# Patient Record
Sex: FEMALE | Race: WHITE | Employment: OTHER | URBAN - METROPOLITAN AREA
[De-identification: names, ages, dates, MRNs, and addresses within clinical notes are randomized per-mention and may not be internally consistent; named-entity substitution may affect disease eponyms.]

---

## 2018-07-06 ENCOUNTER — OFFICE VISIT (OUTPATIENT)
Dept: ORTHOPEDIC SURGERY | Age: 73
End: 2018-07-06

## 2018-07-06 VITALS
DIASTOLIC BLOOD PRESSURE: 68 MMHG | HEIGHT: 61 IN | HEART RATE: 67 BPM | WEIGHT: 140 LBS | SYSTOLIC BLOOD PRESSURE: 120 MMHG | BODY MASS INDEX: 26.43 KG/M2

## 2018-07-06 DIAGNOSIS — M70.61 GREATER TROCHANTERIC BURSITIS OF RIGHT HIP: Primary | ICD-10-CM

## 2018-07-06 DIAGNOSIS — M25.551 RIGHT HIP PAIN: ICD-10-CM

## 2018-07-06 PROCEDURE — 99213 OFFICE O/P EST LOW 20 MIN: CPT | Performed by: PHYSICIAN ASSISTANT

## 2018-07-06 NOTE — PROGRESS NOTES
Chief Complaint    Hip Pain (rt hip lateral pain for about 2-3 months)      History of Present Illness:  Efraín Keith is a 67 y.o. female presents to the office today for a new problem. Patient here complaining of right lateral hip pain for past 2-3 months with no injury or trauma. She denies groin or buttocks pain. She denies lumbar pain or radicular symptoms. She does have a past medical history significant for lumbar scoliosis. She does have an suffer from myelodysplastic syndrome and was told not to take nonsteroidal anti-inflammatory medication.     Pain Assessment  Location of Pain: Pelvis  Location Modifiers: Right  Severity of Pain: 8  Frequency of Pain: Intermittent  Aggravating Factors:  (laying on it)  Limiting Behavior: Some  Result of Injury: No  Work-Related Injury: No  Are there other pain locations you wish to document?: No]       Medical History:  Past Medical History:   Diagnosis Date    Arthritis     Bone marrow disorder     \"sluggish bone marrow\"    Cancer [facility]) breast 1990    COPD (chronic obstructive pulmonary disease) ([address]     Diabetes mellitus ([address]     Greater trochanteric bursitis of right hip 7/6/2018    Hyperlipidemia     Macular degeneration (senile) of retina      Patient Active Problem List    Diagnosis Date Noted    MDS (myelodysplastic syndrome), low grade ([address] 02/10/2015     Priority: High    Greater trochanteric bursitis of right hip 07/06/2018    Personal history of breast cancer 05/16/2016    Wrist sprain 09/11/2014    Closed fracture of ankle 04/17/2014     Past Surgical History:   Procedure Laterality Date    BREAST RECONSTRUCTION      transflap reconstruction - bilateral    BREAST SURGERY  herman mastectomy   reconstruction         1990    CAPSULOTOMY Right 10/2016    COLONOSCOPY  3/14/11    FOOT FUSION      bunionectomy    FOOT SURGERY  04/25/2013    right foot surgery, toes straightened, \"lump\" removed from top of foot    HAND SURGERY 40 mg by mouth every morning.  gabapentin (NEURONTIN) 300 MG capsule TAKE ONE CAPSULE BY MOUTH TWICE A DAY AND AT BEDTIME 90 capsule 0    dicyclomine (BENTYL) 10 MG capsule Take 10 mg by mouth 4 times daily (before meals and nightly)      Multiple Vitamins-Minerals (THERAPEUTIC MULTIVITAMIN-MINERALS) tablet Take 1 tablet by mouth daily. No current facility-administered medications for this visit. Review of Systems:  Relevant review of systems reviewed and available in the patient's chart    Vital Signs:  Vitals:    07/06/18 1109   BP: 120/68   Pulse: 67       General Exam:   Constitutional: Patient is adequately groomed with no evidence of malnutrition  DTRs: Deep tendon reflexes are intact  Mental Status: The patient is oriented to time, place and person. The patient's mood and affect are appropriate. Lymphatic: The lymphatic examination bilaterally reveals all areas to be without enlargement or induration. Vascular: Examination reveals no swelling or calf tenderness. Peripheral pulses are palpable and 2+. Neurological: The patient has good coordination. There is no weakness or sensory deficit. Right hip Examination:    Inspection:  No erythema or signs of infection. There are no cutaneous lesions. Palpation:  There is exquisite tenderness over the greater trochanteric region. Range of Motion:  Full pain-free range of motion right hip    Strength:  5 over 5 strength with hip flexion and extension    Special Tests:  Positive Johnathan's test.  Negative log roll maneuver. Negative Homans test.    Skin: There are no rashes, ulcerations or lesions. Gait: Slightly antalgic gait favoring the unaffected side. Reflex 2+ patellar    Additional Comments:       Additional Examinations:         Contralateral Exam: Examination of the left hip reveals intact skin. The patient demonstrates full painless range of motion with regards to flexion, abduction, internal and external rotation.  There

## 2019-02-18 ENCOUNTER — OFFICE VISIT (OUTPATIENT)
Dept: ORTHOPEDIC SURGERY | Age: 74
End: 2019-02-18
Payer: MEDICARE

## 2019-02-18 VITALS — HEIGHT: 61 IN | BODY MASS INDEX: 26.43 KG/M2 | WEIGHT: 139.99 LBS

## 2019-02-18 DIAGNOSIS — M51.36 DDD (DEGENERATIVE DISC DISEASE), LUMBAR: ICD-10-CM

## 2019-02-18 DIAGNOSIS — M47.816 LUMBAR SPONDYLOSIS: ICD-10-CM

## 2019-02-18 DIAGNOSIS — M41.25 OTHER IDIOPATHIC SCOLIOSIS, THORACOLUMBAR REGION: ICD-10-CM

## 2019-02-18 DIAGNOSIS — M54.50 LUMBAR PAIN: Primary | ICD-10-CM

## 2019-02-18 PROCEDURE — 99203 OFFICE O/P NEW LOW 30 MIN: CPT | Performed by: PHYSICAL MEDICINE & REHABILITATION

## 2019-02-18 RX ORDER — MELOXICAM 15 MG/1
15 TABLET ORAL DAILY
Qty: 30 TABLET | Refills: 1 | Status: SHIPPED | OUTPATIENT
Start: 2019-02-18

## 2019-05-15 ENCOUNTER — OFFICE VISIT (OUTPATIENT)
Dept: ORTHOPEDIC SURGERY | Age: 74
End: 2019-05-15
Payer: MEDICARE

## 2019-05-15 VITALS
DIASTOLIC BLOOD PRESSURE: 68 MMHG | HEIGHT: 61 IN | HEART RATE: 65 BPM | SYSTOLIC BLOOD PRESSURE: 107 MMHG | WEIGHT: 130 LBS | BODY MASS INDEX: 24.55 KG/M2

## 2019-05-15 DIAGNOSIS — M51.36 DDD (DEGENERATIVE DISC DISEASE), LUMBAR: ICD-10-CM

## 2019-05-15 DIAGNOSIS — M70.61 GREATER TROCHANTERIC BURSITIS OF RIGHT HIP: ICD-10-CM

## 2019-05-15 DIAGNOSIS — M25.551 RIGHT HIP PAIN: ICD-10-CM

## 2019-05-15 DIAGNOSIS — M47.816 LUMBAR SPONDYLOSIS: ICD-10-CM

## 2019-05-15 DIAGNOSIS — M54.50 LUMBAR PAIN: Primary | ICD-10-CM

## 2019-05-15 DIAGNOSIS — M41.25 OTHER IDIOPATHIC SCOLIOSIS, THORACOLUMBAR REGION: ICD-10-CM

## 2019-05-15 PROCEDURE — 99214 OFFICE O/P EST MOD 30 MIN: CPT | Performed by: PHYSICAL MEDICINE & REHABILITATION

## 2019-05-15 RX ORDER — HYDROCODONE BITARTRATE AND ACETAMINOPHEN 5; 325 MG/1; MG/1
1 TABLET ORAL 2 TIMES DAILY PRN
Qty: 60 TABLET | Refills: 0 | Status: SHIPPED | OUTPATIENT
Start: 2019-05-15 | End: 2019-06-12 | Stop reason: SDUPTHER

## 2019-05-15 RX ORDER — HYDROCODONE BITARTRATE AND ACETAMINOPHEN 5; 325 MG/1; MG/1
1 TABLET ORAL 2 TIMES DAILY PRN
Qty: 60 TABLET | Refills: 0 | Status: CANCELLED | OUTPATIENT
Start: 2019-07-11 | End: 2019-08-10

## 2019-05-15 RX ORDER — HYDROCODONE BITARTRATE AND ACETAMINOPHEN 5; 325 MG/1; MG/1
1 TABLET ORAL 2 TIMES DAILY PRN
Qty: 60 TABLET | Refills: 0 | Status: CANCELLED | OUTPATIENT
Start: 2019-06-13 | End: 2019-07-13

## 2019-05-15 NOTE — PROGRESS NOTES
New Patient: SPINE    CHIEF COMPLAINT:    Chief Complaint   Patient presents with    Back Pain     F/u LBP       HISTORY OF PRESENT ILLNESS:                The patient is a 75 y.o. female result for lumbar stenosis and scoliosis with epidurals 2016. She is here for 6 week follow-up for right mechanical back pain. No great relief with the [facility]. Her back pain limits her sleeping as well as her standing and walking tolerance. She has no radiating leg pain. She reports no fevers or chills. No recent falls or trauma . Overall her clinical course is not improved    Past Medical History:   Diagnosis Date    Arthritis     Bone marrow disorder     \"sluggish bone marrow\"    Cancer ([facility] [facility] breast 1990    COPD (chronic obstructive pulmonary disease) (HCC)     Diabetes mellitus ([facility]     Greater trochanteric bursitis of right hip 7/6/2018    Hyperlipidemia     Macular degeneration (senile) of retina           Pain Assessment  Location of Pain: Back  Quality of Pain: Throbbing, Sharp, Dull, Aching  Duration of Pain: Persistent  Frequency of Pain: Constant  Aggravating Factors: Walking, Standing(sitting)  Limiting Behavior: Yes  Relieving Factors: Rest  Result of Injury: No  Work-Related Injury: No  Are there other pain locations you wish to document?: No    The pain assessment was noted & reviewed in the medical record today. Current/Past Treatment:   · Physical Therapy:   · Chiropractic:     · Injection:  2016ESI  with me   Medications:            NSAIDS: Past sellar bra             Muscle relaxer:              Steriods:              Neuropathic medications:  gabapentin 100 q.h.s.              Opioids:            Other:   · Surgery/Consult:    Pain medication evaluation:    ORT =May 15, 2019  Pain contract May 15, 2019  Side effects: None  Pain levels currently 8/10  MED: current 0      Work Status/Functionality: Retired    Past Medical History: Medical history form was reviewed today & scanned into the media tab  Past Medical History:   Diagnosis Date    Arthritis     Bone marrow disorder     \"sluggish bone marrow\"    Cancer [facility]) breast 1990    [facility]     Diabetes mellitus ([facility]     Greater trochanteric bursitis of right hip 7/6/2018    Hyperlipidemia     Macular degeneration (senile) of retina       Past Surgical History:     Past Surgical History:   Procedure Laterality Date    BREAST RECONSTRUCTION      transflap reconstruction - bilateral    BREAST SURGERY  herman mastectomy   reconstruction         1990    CAPSULOTOMY Right 10/2016    COLONOSCOPY  3/14/11    FOOT FUSION      bunionectomy    FOOT SURGERY  04/25/2013    right foot surgery, toes straightened, \"lump\" removed from top of foot    HAND SURGERY      bilateral carpal tunnels    HYSTERECTOMY      JOINT REPLACEMENT      both knees    MASTECTOMY      bilateral    SHOULDER ARTHROSCOPY      both shoulders    UPPER GASTROINTESTINAL ENDOSCOPY      UPPER GASTROINTESTINAL ENDOSCOPY  9/14    dilated esophagus     Current Medications:     Current Outpatient Medications:     meloxicam (MOBIC) 15 MG tablet, Take 1 tablet by mouth daily 1 PO QD X 10 DAYS, THEN PRN, Disp: 30 tablet, Rfl: 1    gabapentin (NEURONTIN) 300 MG capsule, TAKE ONE CAPSULE BY MOUTH TWICE A DAY AND AT BEDTIME, Disp: 90 capsule, Rfl: 0    magnesium oxide (MAG-OX) 400 MG tablet, Take 400 mg by mouth daily, Disp: , Rfl:     methocarbamol (ROBAXIN) 500 MG tablet, Take 500 mg by mouth nightly , Disp: , Rfl:     Biotin 1 MG CAPS, Take 7,500 mcg by mouth daily , Disp: , Rfl:     Multiple Vitamins-Minerals (OCUVITE PRESERVISION PO), Take by mouth 2 times daily , Disp: , Rfl:     zoledronic acid (RECLAST) 5 MG/100ML SOLN, Infuse 5 mg intravenously once Annually, Disp: , Rfl:     SPIRIVA HANDIHALER 18 MCG inhalation capsule, Inhale 18 mcg into the lungs daily , Disp: , Rfl:     albuterol sulfate  (90 BASE) MCG/ACT inhaler, Inhale 2 puffs into the lungs every 6 hours as needed for Wheezing, Disp: , Rfl:     dicyclomine (BENTYL) 10 MG capsule, Take 10 mg by mouth 4 times daily (before meals and nightly), Disp: , Rfl:     atorvastatin (LIPITOR) 10 MG tablet, Take 10 mg by mouth daily, Disp: , Rfl:     pantoprazole (PROTONIX) 40 MG tablet, Take 40 mg by mouth daily. , Disp: , Rfl:     vitamin D (CHOLECALCIFEROL) 1000 UNIT TABS tablet, Take 1,000 Units by mouth daily. , Disp: , Rfl:     Pyridoxine HCl (VITAMIN B-6) 50 MG tablet, Take 25 mg by mouth daily. , Disp: , Rfl:     Multiple Vitamins-Minerals (THERAPEUTIC MULTIVITAMIN-MINERALS) tablet, Take 1 tablet by mouth daily. , Disp: , Rfl:     calcium citrate (CALCITRATE) 950 MG tablet, Take 1 tablet by mouth daily. , Disp: , Rfl:     propranolol (INDERAL) 40 MG tablet, Take 40 mg by mouth 2 times daily. , Disp: , Rfl:     paroxetine (PAXIL) 20 MG tablet, Take 40 mg by mouth every morning., Disp: , Rfl:   Allergies:  Ergotrate [ergonovine] and Nicergoline  Social History:    reports that she quit smoking about 7 years ago. She smoked 0.50 packs per day. She has never used smokeless tobacco. She reports that she drinks alcohol. She reports that she does not use drugs.   Family History:   Family History   Problem Relation Age of Onset    Cancer Maternal Aunt        REVIEW OF SYSTEMS: Full ROS noted & scanned   CONSTITUTIONAL: Denies unexplained weight loss, fevers, chills or fatigue  NEUROLOGICAL: Denies unsteady gait or progressive weakness  MUSCULOSKELETAL: Denies joint swelling or redness  PSYCHOLOGICAL: Denies anxiety, depression   SKIN: Denies skin changes, delayed healing, rash, itching   HEMATOLOGIC: Denies easy bleeding or bruising  ENDOCRINE: Denies excessive thirst, urination, heat/cold  RESPIRATORY: Denies current dyspnea, cough  GI: Denies nausea, vomiting, diarrhea   : Denies bowel or bladder issues       PHYSICAL EXAM:    Vitals: Blood pressure 107/68, pulse 65, height 5' 0.98\" (1.549 m), weight 130 lb (59 kg). GENERAL EXAM:  · General Apparence: Patient is adequately groomed with no evidence of malnutrition. · Orientation: The patient is oriented to time, place and person. · Mood & Affect:The patient's mood and affect are appropriate   · Vascular: Examination reveals no swelling tenderness in upper or lower extremities. Good capillary refill  · Lymphatic: The lymphatic examination bilaterally reveals all areas to be without enlargement or induration  · Sensation: Sensation is intact without deficit  · Coordination/Balance: Good coordination       LUMBAR/SACRAL EXAMINATION:  · Inspection:She is scoliotic      · Palpation:   No evidence of tenderness at the midline. No tenderness bilaterally at the paraspinal or trochanters. There is no step-off or paraspinal spasm. · Range of Motion: For lumbar flexion, 20° loss of extension   · Strength:   Strength testing is 5/5 in all muscle groups tested. · Special Tests:   Straight leg raise and crossed SLR negative. Leg length and pelvis level.  0 out of 5 Meredith's signs. · Skin: There are no rashes, ulcerations or lesions. · Reflexes: Reflexes are symmetrically 2+ at the patellar and ankle tendons. Clonus absent bilaterally at the feet. · Gait & station: Normal gait   · Additional Examinations:   · RIGHT LOWER EXTREMITY: Inspection/examination of the right lower extremity does not show any tenderness, deformity or injury. Range of motion is full. There is no gross instability. There are no rashes, ulcerations or lesions. Strength and tone are normal.  ·   · LEFT LOWER EXTREMITY:  Inspection/examination of the left lower extremity does not show any tenderness, deformity or injury. Range of motion is full. There is no gross instability. There are no rashes, ulcerations or lesions.   Strength and tone are normal.    Diagnostic Testin CBC, comprehensive panel reviewed    2 views lumbar spine 2019 AP and lateral show advanced lumbar DDD and spondylosis with superimposed significant scoliosis    Impression:    Lumbar DDD and spondylosis, scoliosis  Chronic back pain      Plan:     Declining surgical consult  No contraindication to low-dose opioid maintenance  Start hydrocodone 5 mg b.i.d. #60  Follow up to establish medication maintenance in 30 days  Mirlande Campbell

## 2019-06-12 ENCOUNTER — OFFICE VISIT (OUTPATIENT)
Dept: ORTHOPEDIC SURGERY | Age: 74
End: 2019-06-12
Payer: MEDICARE

## 2019-06-12 VITALS — WEIGHT: 130.07 LBS | BODY MASS INDEX: 24.56 KG/M2 | HEIGHT: 61 IN

## 2019-06-12 DIAGNOSIS — M51.36 DDD (DEGENERATIVE DISC DISEASE), LUMBAR: ICD-10-CM

## 2019-06-12 DIAGNOSIS — M41.25 OTHER IDIOPATHIC SCOLIOSIS, THORACOLUMBAR REGION: Primary | ICD-10-CM

## 2019-06-12 DIAGNOSIS — M47.816 LUMBAR SPONDYLOSIS: ICD-10-CM

## 2019-06-12 PROCEDURE — 99213 OFFICE O/P EST LOW 20 MIN: CPT | Performed by: PHYSICAL MEDICINE & REHABILITATION

## 2019-06-12 RX ORDER — HYDROCODONE BITARTRATE AND ACETAMINOPHEN 5; 325 MG/1; MG/1
1 TABLET ORAL 2 TIMES DAILY PRN
Qty: 60 TABLET | Refills: 0 | Status: SHIPPED | OUTPATIENT
Start: 2019-08-10 | End: 2019-09-09 | Stop reason: SDUPTHER

## 2019-06-12 RX ORDER — HYDROCODONE BITARTRATE AND ACETAMINOPHEN 5; 325 MG/1; MG/1
1 TABLET ORAL 2 TIMES DAILY PRN
Qty: 60 TABLET | Refills: 0 | Status: SHIPPED | OUTPATIENT
Start: 2019-07-12 | End: 2019-09-09 | Stop reason: SDUPTHER

## 2019-06-12 RX ORDER — HYDROCODONE BITARTRATE AND ACETAMINOPHEN 5; 325 MG/1; MG/1
1 TABLET ORAL 2 TIMES DAILY PRN
Qty: 60 TABLET | Refills: 0 | Status: SHIPPED | OUTPATIENT
Start: 2019-06-14 | End: 2019-09-09 | Stop reason: SDUPTHER

## 2019-06-12 NOTE — PROGRESS NOTES
New Patient: SPINE    CHIEF COMPLAINT:    Chief Complaint   Patient presents with    Back Pain     F/u LBP       HISTORY OF PRESENT ILLNESS:                The patient is a 75 y.o. female result for lumbar stenosis and scoliosis. She is here for her medication maintenance for chronic back pain. She is reports tolerable back pain with improvements with hydrocodone 5 mg twice daily. Mild side effects with sedation but tolerable. She does not take the medicine if she has to drive or be active during the day. She sleeps up to 6 hours a night. There is no progressive weakness. There are no other side effects    Past Medical History:   Diagnosis Date    Arthritis     Bone marrow disorder     \"sluggish bone marrow\"    Cancer ([facility] [address] breast 1990    [facility]     Diabetes mellitus ([facility]     Greater trochanteric bursitis of right hip 7/6/2018    Hyperlipidemia     Macular degeneration (senile) of retina           Pain Assessment  Location of Pain: Back  Quality of Pain: Throbbing, Sharp, Dull, Aching  Duration of Pain: Persistent  Frequency of Pain: Constant  Aggravating Factors: Walking, Standing(sitting)  Limiting Behavior: Yes  Relieving Factors: Rest  Result of Injury: No  Work-Related Injury: No  Are there other pain locations you wish to document?: No    The pain assessment was noted & reviewed in the medical record today. Current/Past Treatment:   · Physical Therapy:   · Chiropractic:     · Injection:  2016 LUIS  with me   Medications:            NSAIDS: Past             Muscle relaxer:              Steriods:              Neuropathic medications:  gabapentin 100 q.h.s.              Opioids:            Other:   · Surgery/Consult:    Pain medication evaluation:    ORT =May 15, 2019  Pain contract May 15, 2019  Side effects: None  Pain levels currently 8/10  MED: current 10      Work Status/Functionality: Retired    Past Medical History: Medical history form was reviewed today & scanned into the media tab  Past Medical History:   Diagnosis Date    Arthritis     Bone marrow disorder     \"sluggish bone marrow\"    Cancer [facility]) breast 1990    COPD (chronic obstructive pulmonary disease) ([facility]     Diabetes mellitus ([facility] [facility]     Greater trochanteric bursitis of right hip 7/6/2018    Hyperlipidemia     Macular degeneration (senile) of retina       Past Surgical History:     Past Surgical History:   Procedure Laterality Date    BREAST RECONSTRUCTION      transflap reconstruction - bilateral    BREAST SURGERY  herman mastectomy   reconstruction         1990    CAPSULOTOMY Right 10/2016    COLONOSCOPY  3/14/11    FOOT FUSION      bunionectomy    FOOT SURGERY  04/25/2013    right foot surgery, toes straightened, \"lump\" removed from top of foot    HAND SURGERY      bilateral carpal tunnels    HYSTERECTOMY      JOINT REPLACEMENT      both knees    MASTECTOMY      bilateral    SHOULDER ARTHROSCOPY      both shoulders    UPPER GASTROINTESTINAL ENDOSCOPY      UPPER GASTROINTESTINAL ENDOSCOPY  9/14    dilated esophagus     Current Medications:     Current Outpatient Medications:     meloxicam (MOBIC) 15 MG tablet, Take 1 tablet by mouth daily 1 PO QD X 10 DAYS, THEN PRN, Disp: 30 tablet, Rfl: 1    gabapentin (NEURONTIN) 300 MG capsule, TAKE ONE CAPSULE BY MOUTH TWICE A DAY AND AT BEDTIME, Disp: 90 capsule, Rfl: 0    magnesium oxide (MAG-OX) 400 MG tablet, Take 400 mg by mouth daily, Disp: , Rfl:     methocarbamol (ROBAXIN) 500 MG tablet, Take 500 mg by mouth nightly , Disp: , Rfl:     Biotin 1 MG CAPS, Take 7,500 mcg by mouth daily , Disp: , Rfl:     Multiple Vitamins-Minerals (OCUVITE PRESERVISION PO), Take by mouth 2 times daily , Disp: , Rfl:     zoledronic acid (RECLAST) 5 MG/100ML SOLN, Infuse 5 mg intravenously once Annually, Disp: , Rfl:     SPIRIVA HANDIHALER 18 MCG inhalation capsule, Inhale 18 mcg into the lungs daily , Disp: , Rfl:     albuterol sulfate  (90 BASE) MCG/ACT inhaler, Inhale 2 puffs into the lungs every 6 hours as needed for Wheezing, Disp: , Rfl:     dicyclomine (BENTYL) 10 MG capsule, Take 10 mg by mouth 4 times daily (before meals and nightly), Disp: , Rfl:     atorvastatin (LIPITOR) 10 MG tablet, Take 10 mg by mouth daily, Disp: , Rfl:     pantoprazole (PROTONIX) 40 MG tablet, Take 40 mg by mouth daily. , Disp: , Rfl:     vitamin D (CHOLECALCIFEROL) 1000 UNIT TABS tablet, Take 1,000 Units by mouth daily. , Disp: , Rfl:     Pyridoxine HCl (VITAMIN B-6) 50 MG tablet, Take 25 mg by mouth daily. , Disp: , Rfl:     Multiple Vitamins-Minerals (THERAPEUTIC MULTIVITAMIN-MINERALS) tablet, Take 1 tablet by mouth daily. , Disp: , Rfl:     calcium citrate (CALCITRATE) 950 MG tablet, Take 1 tablet by mouth daily. , Disp: , Rfl:     propranolol (INDERAL) 40 MG tablet, Take 40 mg by mouth 2 times daily. , Disp: , Rfl:     paroxetine (PAXIL) 20 MG tablet, Take 40 mg by mouth every morning., Disp: , Rfl:   Allergies:  Ergotrate [ergonovine] and Nicergoline  Social History:    reports that she quit smoking about 7 years ago. She smoked 0.50 packs per day. She has never used smokeless tobacco. She reports that she drinks alcohol. She reports that she does not use drugs.   Family History:   Family History   Problem Relation Age of Onset    Cancer Maternal Aunt        REVIEW OF SYSTEMS: Full ROS noted & scanned   CONSTITUTIONAL: Denies unexplained weight loss, fevers, chills or fatigue  NEUROLOGICAL: Denies unsteady gait or progressive weakness  MUSCULOSKELETAL: Denies joint swelling or redness  PSYCHOLOGICAL: Denies anxiety, depression   SKIN: Denies skin changes, delayed healing, rash, itching   HEMATOLOGIC: Denies easy bleeding or bruising  ENDOCRINE: Denies excessive thirst, urination, heat/cold  RESPIRATORY: Denies current dyspnea, cough  GI: Denies nausea, vomiting, diarrhea   : Denies bowel or bladder issues       PHYSICAL EXAM:    Vitals: Blood pressure 107/68, pulse 65, height 5' 0.98\" (1.549 m), weight 130 lb (59 kg). GENERAL EXAM:  · General Apparence: Patient is adequately groomed with no evidence of malnutrition. · Orientation: The patient is oriented to time, place and person. · Mood & Affect:The patient's mood and affect are appropriate   · Vascular: Examination reveals no swelling tenderness in upper or lower extremities. Good capillary refill  · Lymphatic: The lymphatic examination bilaterally reveals all areas to be without enlargement or induration  · Sensation: Sensation is intact without deficit  · Coordination/Balance: Good coordination       LUMBAR/SACRAL EXAMINATION:  · Inspection:She is scoliotic      · Palpation:   No evidence of tenderness at the midline. No tenderness bilaterally at the paraspinal or trochanters. There is no step-off or paraspinal spasm. · Range of Motion: For lumbar flexion, 20° loss of extension   · Strength:   Strength testing is 5/5 in all muscle groups tested. · Special Tests:   Straight leg raise and crossed SLR negative. Leg length and pelvis level.  0 out of 5 Meredith's signs. · Skin: There are no rashes, ulcerations or lesions. · Reflexes: Reflexes are symmetrically 2+ at the patellar and ankle tendons. Clonus absent bilaterally at the feet. · Gait & station: Normal gait   · Additional Examinations:   · RIGHT LOWER EXTREMITY: Inspection/examination of the right lower extremity does not show any tenderness, deformity or injury. Range of motion is full. There is no gross instability. There are no rashes, ulcerations or lesions. Strength and tone are normal.  ·   · LEFT LOWER EXTREMITY:  Inspection/examination of the left lower extremity does not show any tenderness, deformity or injury. Range of motion is full. There is no gross instability. There are no rashes, ulcerations or lesions.   Strength and tone are normal.    Diagnostic Testin CBC, comprehensive panel reviewed    2 Progress will be monitored towards achieving/maintaining therapeutic goals:    1. Improving perceived interference of pain with ADL's, ability to perform HEP, continue to improve in overall flexibility, ROM, strength and endurance, ability to do household activities indoor and/or outdoor, work and social/leisure activities. Improve psychosocial and physical functioning. 2. Improving sleep to 6-7 hours a night. Improve mood/ anxiety and depression symptoms    3. Reduction of reliance on opioid analgesia/more appropriate opioid use. Utilization of adjuvant medications as appropriate. Risks and benefits of the medications and alternative treatments have been discussed with the patient. The patient was advised against drinking alcohol with the opioid pain medicines, advised against driving or handling machinery when starting or adjusting the dose of medicines, feeling groggy or drowsy, or if having any cognitive issues related to the current medications. The patient is fully aware of the risk of overdose and death, if medicines are misused and not taken as prescribed. Discussed patient's responsibility to safely store and appropriately dispose up medication. Prescription for Narcan offered. If the patient develops new symptoms or if the symptoms worsen, the patient was told to call the office.         Daved Ganser

## 2019-06-13 ENCOUNTER — HOSPITAL ENCOUNTER (OUTPATIENT)
Dept: NUCLEAR MEDICINE | Age: 74
Discharge: HOME | End: 2019-06-13
Payer: MEDICARE

## 2019-06-13 DIAGNOSIS — Z85.3 HX OF BREAST CANCER: ICD-10-CM

## 2019-06-13 DIAGNOSIS — D46.9 MYELODYSPLASTIC SYNDROME (HCC): ICD-10-CM

## 2019-06-13 PROCEDURE — 78306 BONE IMAGING WHOLE BODY: CPT

## 2019-06-13 PROCEDURE — 3430000000 HC RX DIAGNOSTIC RADIOPHARMACEUTICAL: Performed by: INTERNAL MEDICINE

## 2019-06-13 PROCEDURE — A9503 TC99M MEDRONATE: HCPCS | Performed by: INTERNAL MEDICINE

## 2019-06-13 RX ORDER — TC 99M MEDRONATE 20 MG/10ML
25.9 INJECTION, POWDER, LYOPHILIZED, FOR SOLUTION INTRAVENOUS
Status: COMPLETED | OUTPATIENT
Start: 2019-06-13 | End: 2019-06-13

## 2019-06-13 RX ADMIN — TC 99M MEDRONATE 25.9 MILLICURIE: 20 INJECTION, POWDER, LYOPHILIZED, FOR SOLUTION INTRAVENOUS at 08:27

## 2019-06-14 ENCOUNTER — HOSPITAL ENCOUNTER (OUTPATIENT)
Age: 74
End: 2019-06-14
Payer: MEDICARE

## 2019-06-14 ENCOUNTER — HOSPITAL ENCOUNTER (OUTPATIENT)
Age: 74
Discharge: HOME | End: 2019-06-14
Payer: MEDICARE

## 2019-06-14 ENCOUNTER — HOSPITAL ENCOUNTER (OUTPATIENT)
Dept: CT IMAGING | Age: 74
Discharge: HOME | End: 2019-06-14
Payer: MEDICARE

## 2019-06-14 DIAGNOSIS — D46.9 MYELODYSPLASTIC SYNDROME (HCC): ICD-10-CM

## 2019-06-14 LAB
BUN BLDV-MCNC: 23 MG/DL (ref 7–20)
CREAT SERPL-MCNC: 0.6 MG/DL (ref 0.6–1.2)
GFR AFRICAN AMERICAN: >60
GFR NON-AFRICAN AMERICAN: >60

## 2019-06-14 PROCEDURE — 74177 CT ABD & PELVIS W/CONTRAST: CPT

## 2019-06-14 PROCEDURE — 84520 ASSAY OF UREA NITROGEN: CPT

## 2019-06-14 PROCEDURE — 36415 COLL VENOUS BLD VENIPUNCTURE: CPT

## 2019-06-14 PROCEDURE — 6360000004 HC RX CONTRAST MEDICATION: Performed by: INTERNAL MEDICINE

## 2019-06-14 PROCEDURE — 82565 ASSAY OF CREATININE: CPT

## 2019-06-14 RX ADMIN — IOHEXOL 50 ML: 240 INJECTION, SOLUTION INTRATHECAL; INTRAVASCULAR; INTRAVENOUS; ORAL at 13:01

## 2019-06-14 RX ADMIN — IOPAMIDOL 100 ML: 755 INJECTION, SOLUTION INTRAVENOUS at 13:01

## 2019-09-11 ENCOUNTER — OFFICE VISIT (OUTPATIENT)
Dept: ORTHOPEDIC SURGERY | Age: 74
End: 2019-09-11
Payer: MEDICARE

## 2019-09-11 VITALS — HEIGHT: 61 IN | BODY MASS INDEX: 24.56 KG/M2 | WEIGHT: 130.07 LBS

## 2019-09-11 DIAGNOSIS — M47.816 LUMBAR SPONDYLOSIS: ICD-10-CM

## 2019-09-11 DIAGNOSIS — M51.36 DDD (DEGENERATIVE DISC DISEASE), LUMBAR: ICD-10-CM

## 2019-09-11 DIAGNOSIS — M41.25 OTHER IDIOPATHIC SCOLIOSIS, THORACOLUMBAR REGION: ICD-10-CM

## 2019-09-11 PROCEDURE — 99213 OFFICE O/P EST LOW 20 MIN: CPT | Performed by: PHYSICAL MEDICINE & REHABILITATION

## 2019-09-11 RX ORDER — HYDROCODONE BITARTRATE AND ACETAMINOPHEN 5; 325 MG/1; MG/1
1 TABLET ORAL 3 TIMES DAILY PRN
Qty: 75 TABLET | Refills: 0 | Status: SHIPPED | OUTPATIENT
Start: 2019-10-10 | End: 2019-12-09 | Stop reason: SDUPTHER

## 2019-09-11 RX ORDER — HYDROCODONE BITARTRATE AND ACETAMINOPHEN 5; 325 MG/1; MG/1
1 TABLET ORAL 3 TIMES DAILY PRN
Qty: 75 TABLET | Refills: 0 | Status: SHIPPED | OUTPATIENT
Start: 2019-09-12 | End: 2019-12-09 | Stop reason: SDUPTHER

## 2019-09-11 RX ORDER — HYDROCODONE BITARTRATE AND ACETAMINOPHEN 5; 325 MG/1; MG/1
1 TABLET ORAL 3 TIMES DAILY PRN
Qty: 75 TABLET | Refills: 0 | Status: SHIPPED | OUTPATIENT
Start: 2019-11-08 | End: 2019-12-09 | Stop reason: SDUPTHER

## 2019-09-11 NOTE — PROGRESS NOTES
New Patient: SPINE    CHIEF COMPLAINT:    Chief Complaint   Patient presents with    Back Pain     F/u LBP       HISTORY OF PRESENT ILLNESS:                The patient is a 75 y.o. female result for lumbar stenosis and scoliosis. She is here for her medication maintenance for chronic back pain. She is reports tolerable back pain with improvements with hydrocodone 5 mg twice daily occasionally she feels she could take it 3 times a day. Mild side effects with sedation but tolerable. She does not fall asleep. She does not take it always 3 times a day because at times she drives during the day. She sleeps up to 6 hours a night. There is no progressive weakness. There are no other side effects    Past Medical History:   Diagnosis Date    Arthritis     Bone marrow disorder     \"sluggish bone marrow\"    Cancer ([facility] [address] breast 1990    [facility]     Diabetes mellitus ([facility] [address]     Greater trochanteric bursitis of right hip 7/6/2018    Hyperlipidemia     Macular degeneration (senile) of retina           Pain Assessment  Location of Pain: Back  Quality of Pain: Throbbing, Sharp, Dull, Aching  Duration of Pain: Persistent  Frequency of Pain: Constant  Aggravating Factors: Walking, Standing(sitting)  Limiting Behavior: Yes  Relieving Factors: Rest  Result of Injury: No  Work-Related Injury: No  Are there other pain locations you wish to document?: No    The pain assessment was noted & reviewed in the medical record today. Current/Past Treatment:   · Physical Therapy:   · Chiropractic:     · Injection:  2016 LUIS  with me   Medications:            NSAIDS: Past             Muscle relaxer:              Steriods:              Neuropathic medications:  gabapentin 100 q.h.s.              Opioids: Hydrocodone 5 mg twice daily            Other:   · Surgery/Consult:    Pain medication evaluation:    ORT =May 15, 2019  Pain contract May 15, 2019  Side effects: Constipation controlled SOLN, Infuse 5 mg intravenously once Annually, Disp: , Rfl:     SPIRIVA HANDIHALER 18 MCG inhalation capsule, Inhale 18 mcg into the lungs daily , Disp: , Rfl:     albuterol sulfate  (90 BASE) MCG/ACT inhaler, Inhale 2 puffs into the lungs every 6 hours as needed for Wheezing, Disp: , Rfl:     dicyclomine (BENTYL) 10 MG capsule, Take 10 mg by mouth 4 times daily (before meals and nightly), Disp: , Rfl:     atorvastatin (LIPITOR) 10 MG tablet, Take 10 mg by mouth daily, Disp: , Rfl:     pantoprazole (PROTONIX) 40 MG tablet, Take 40 mg by mouth daily. , Disp: , Rfl:     vitamin D (CHOLECALCIFEROL) 1000 UNIT TABS tablet, Take 1,000 Units by mouth daily. , Disp: , Rfl:     Pyridoxine HCl (VITAMIN B-6) 50 MG tablet, Take 25 mg by mouth daily. , Disp: , Rfl:     Multiple Vitamins-Minerals (THERAPEUTIC MULTIVITAMIN-MINERALS) tablet, Take 1 tablet by mouth daily. , Disp: , Rfl:     calcium citrate (CALCITRATE) 950 MG tablet, Take 1 tablet by mouth daily. , Disp: , Rfl:     propranolol (INDERAL) 40 MG tablet, Take 40 mg by mouth 2 times daily. , Disp: , Rfl:     paroxetine (PAXIL) 20 MG tablet, Take 40 mg by mouth every morning., Disp: , Rfl:   Allergies:  Ergotrate [ergonovine] and Nicergoline  Social History:    reports that she quit smoking about 7 years ago. She smoked 0.50 packs per day. She has never used smokeless tobacco. She reports that she drinks alcohol. She reports that she does not use drugs.   Family History:   Family History   Problem Relation Age of Onset    Cancer Maternal Aunt        REVIEW OF SYSTEMS: Full ROS noted & scanned   CONSTITUTIONAL: Denies unexplained weight loss, fevers, chills or fatigue  NEUROLOGICAL: Denies unsteady gait or progressive weakness  MUSCULOSKELETAL: Denies joint swelling or redness  PSYCHOLOGICAL: Denies anxiety, depression   SKIN: Denies skin changes, delayed healing, rash, itching   HEMATOLOGIC: Denies easy bleeding or bruising  ENDOCRINE: Denies excessive thirst,

## 2019-12-11 ENCOUNTER — OFFICE VISIT (OUTPATIENT)
Dept: ORTHOPEDIC SURGERY | Age: 74
End: 2019-12-11
Payer: MEDICARE

## 2019-12-11 VITALS — BODY MASS INDEX: 24.56 KG/M2 | WEIGHT: 130.07 LBS | HEIGHT: 61 IN

## 2019-12-11 DIAGNOSIS — M47.816 LUMBAR SPONDYLOSIS: ICD-10-CM

## 2019-12-11 DIAGNOSIS — M51.36 DDD (DEGENERATIVE DISC DISEASE), LUMBAR: ICD-10-CM

## 2019-12-11 DIAGNOSIS — M41.25 OTHER IDIOPATHIC SCOLIOSIS, THORACOLUMBAR REGION: ICD-10-CM

## 2019-12-11 PROCEDURE — 99213 OFFICE O/P EST LOW 20 MIN: CPT | Performed by: PHYSICAL MEDICINE & REHABILITATION

## 2019-12-11 RX ORDER — HYDROCODONE BITARTRATE AND ACETAMINOPHEN 5; 325 MG/1; MG/1
1 TABLET ORAL 3 TIMES DAILY PRN
Qty: 75 TABLET | Refills: 0 | Status: SHIPPED | OUTPATIENT
Start: 2019-12-11 | End: 2020-03-11 | Stop reason: SDUPTHER

## 2019-12-11 RX ORDER — HYDROCODONE BITARTRATE AND ACETAMINOPHEN 5; 325 MG/1; MG/1
1 TABLET ORAL 3 TIMES DAILY PRN
Qty: 75 TABLET | Refills: 0 | Status: SHIPPED | OUTPATIENT
Start: 2020-02-07 | End: 2020-03-11 | Stop reason: SDUPTHER

## 2019-12-11 RX ORDER — HYDROCODONE BITARTRATE AND ACETAMINOPHEN 5; 325 MG/1; MG/1
1 TABLET ORAL 3 TIMES DAILY PRN
Qty: 75 TABLET | Refills: 0 | Status: SHIPPED | OUTPATIENT
Start: 2020-01-09 | End: 2020-03-11 | Stop reason: SDUPTHER

## 2019-12-22 ENCOUNTER — APPOINTMENT (OUTPATIENT)
Dept: GENERAL RADIOLOGY | Age: 74
End: 2019-12-22
Payer: MEDICARE

## 2019-12-22 ENCOUNTER — APPOINTMENT (OUTPATIENT)
Dept: CT IMAGING | Age: 74
End: 2019-12-22
Payer: MEDICARE

## 2019-12-22 ENCOUNTER — HOSPITAL ENCOUNTER (EMERGENCY)
Age: 74
Discharge: HOME | End: 2019-12-22
Payer: MEDICARE

## 2019-12-22 VITALS
RESPIRATION RATE: 14 BRPM | WEIGHT: 132 LBS | OXYGEN SATURATION: 99 % | BODY MASS INDEX: 24.95 KG/M2 | SYSTOLIC BLOOD PRESSURE: 128 MMHG | TEMPERATURE: 98.6 F | DIASTOLIC BLOOD PRESSURE: 73 MMHG | HEART RATE: 77 BPM

## 2019-12-22 DIAGNOSIS — S00.83XA CONTUSION OF FACE, INITIAL ENCOUNTER: ICD-10-CM

## 2019-12-22 DIAGNOSIS — Z78.9 ALCOHOL USE: ICD-10-CM

## 2019-12-22 DIAGNOSIS — S63.264A: ICD-10-CM

## 2019-12-22 DIAGNOSIS — S63.262A: ICD-10-CM
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
## 2019-12-22 LAB
A/G RATIO: 1.6 (ref 1.1–2.2)
ALBUMIN SERPL-MCNC: 4.5 G/DL (ref 3.4–5)
ALP BLD-CCNC: 66 U/L (ref 40–129)
ALT SERPL-CCNC: 27 U/L (ref 10–40)
ANION GAP SERPL CALCULATED.3IONS-SCNC: 18 MMOL/L (ref 3–16)
AST SERPL-CCNC: 36 U/L (ref 15–37)
BASOPHILS ABSOLUTE: 0.2 K/UL (ref 0–0.2)
BASOPHILS RELATIVE PERCENT: 2 %
BILIRUB SERPL-MCNC: 1 MG/DL (ref 0–1)
BUN BLDV-MCNC: 15 MG/DL (ref 7–20)
CALCIUM SERPL-MCNC: 9.2 MG/DL (ref 8.3–10.6)
CHLORIDE BLD-SCNC: 95 MMOL/L (ref 99–110)
CO2: 24 MMOL/L (ref 21–32)
CREAT SERPL-MCNC: <0.5 MG/DL (ref 0.6–1.2)
EOSINOPHILS ABSOLUTE: 0.6 K/UL (ref 0–0.6)
EOSINOPHILS RELATIVE PERCENT: 4.9 %
ETHANOL: 189 MG/DL (ref 0–0.08)
GFR AFRICAN AMERICAN: >60
GFR NON-AFRICAN AMERICAN: >60
GLOBULIN: 2.8 G/DL
GLUCOSE BLD-MCNC: 106 MG/DL (ref 70–99)
HCT VFR BLD CALC: 29.2 % (ref 36–48)
HEMATOLOGY PATH CONSULT: YES
HEMOGLOBIN: 9.8 G/DL (ref 12–16)
INR BLD: 1.09 (ref 0.86–1.14)
LYMPHOCYTES ABSOLUTE: 2 K/UL (ref 1–5.1)
LYMPHOCYTES RELATIVE PERCENT: 17.8 %
MCH RBC QN AUTO: 37.3 PG (ref 26–34)
MCHC RBC AUTO-ENTMCNC: 33.7 G/DL (ref 31–36)
MCV RBC AUTO: 110.8 FL (ref 80–100)
MONOCYTES ABSOLUTE: 0.8 K/UL (ref 0–1.3)
MONOCYTES RELATIVE PERCENT: 6.9 %
NEUTROPHILS ABSOLUTE: 7.8 K/UL (ref 1.7–7.7)
NEUTROPHILS RELATIVE PERCENT: 68.4 %
PDW BLD-RTO: 19.8 % (ref 12.4–15.4)
PLATELET # BLD: 443 K/UL (ref 135–450)
PLATELET SLIDE REVIEW: ADEQUATE
PMV BLD AUTO: 6.7 FL (ref 5–10.5)
POTASSIUM REFLEX MAGNESIUM: 3.7 MMOL/L (ref 3.5–5.1)
PROTHROMBIN TIME: 12.6 SEC (ref 10–13.2)
RBC # BLD: 2.63 M/UL (ref 4–5.2)
SLIDE REVIEW: ABNORMAL
SODIUM BLD-SCNC: 137 MMOL/L (ref 136–145)
TOTAL PROTEIN: 7.3 G/DL (ref 6.4–8.2)
WBC # BLD: 11.5 K/UL (ref 4–11)

## 2019-12-22 PROCEDURE — 85610 PROTHROMBIN TIME: CPT

## 2019-12-22 PROCEDURE — 70450 CT HEAD/BRAIN W/O DYE: CPT

## 2019-12-22 PROCEDURE — 80053 COMPREHEN METABOLIC PANEL: CPT

## 2019-12-22 PROCEDURE — 85025 COMPLETE CBC W/AUTO DIFF WBC: CPT

## 2019-12-22 PROCEDURE — 72125 CT NECK SPINE W/O DYE: CPT

## 2019-12-22 PROCEDURE — 73130 X-RAY EXAM OF HAND: CPT

## 2019-12-22 PROCEDURE — 4500000024 HC ED LEVEL 4 PROCEDURE

## 2019-12-22 PROCEDURE — 70486 CT MAXILLOFACIAL W/O DYE: CPT

## 2019-12-22 PROCEDURE — G0480 DRUG TEST DEF 1-7 CLASSES: HCPCS

## 2019-12-22 PROCEDURE — 99284 EMERGENCY DEPT VISIT MOD MDM: CPT

## 2019-12-22 ASSESSMENT — ENCOUNTER SYMPTOMS
NAUSEA: 0
FACIAL SWELLING: 1
SHORTNESS OF BREATH: 0
ABDOMINAL PAIN: 0
VOMITING: 0

## 2019-12-22 ASSESSMENT — PAIN SCALES - GENERAL
PAINLEVEL_OUTOF10: 0
PAINLEVEL_OUTOF10: 8

## 2019-12-23 LAB — HEMATOLOGY PATH CONSULT: NORMAL

## 2020-01-02 ENCOUNTER — HOSPITAL ENCOUNTER (OUTPATIENT)
Dept: OCCUPATIONAL THERAPY | Age: 75
Setting detail: THERAPIES SERIES
Discharge: HOME | End: 2020-01-02
Payer: MEDICARE

## 2020-01-02 ENCOUNTER — OFFICE VISIT (OUTPATIENT)
Dept: ORTHOPEDIC SURGERY | Age: 75
End: 2020-01-02
Payer: MEDICARE

## 2020-01-02 VITALS — BODY MASS INDEX: 24.93 KG/M2 | RESPIRATION RATE: 17 BRPM | HEIGHT: 61 IN | WEIGHT: 132.06 LBS

## 2020-01-02 PROBLEM — S63.269A: Status: ACTIVE | Noted: 2020-01-02

## 2020-01-02 PROCEDURE — 99203 OFFICE O/P NEW LOW 30 MIN: CPT | Performed by: ORTHOPAEDIC SURGERY

## 2020-01-02 PROCEDURE — L3919 HO W/O JOINTS CF: HCPCS | Performed by: OCCUPATIONAL THERAPIST

## 2020-01-02 RX ORDER — CYCLOSPORINE 0.5 MG/ML
1 EMULSION OPHTHALMIC
COMMUNITY

## 2020-01-02 RX ORDER — HYDROXYZINE HYDROCHLORIDE 25 MG/1
12.5-25 TABLET, FILM COATED ORAL
COMMUNITY
Start: 2019-10-21

## 2020-01-02 NOTE — PROGRESS NOTES
Chief Complaint  Hand Pain (NP RT MIDDLE AND RING FINGER DISLOCATIONS FROM 12/22)      History of Present Illness:  Herrera Bhatti is a 75 y.o. female. She presents today for a new hand surgery specialty evaluation regarding the right hand. She unfortunately fell going up onto her patio and landed onto the right frontal region of her face but also had a injury to the right hand. In the emergency room she was noted to have dislocations dorsally at the MP region of the right long and ring fingers. These were reduced in the emergency department and she was splinted. Medical History  Patient's medications, allergies, past medical, surgical, social and family histories were reviewed and updated as appropriate. Review of Systems  Pertinent items are noted in HPI  Denies fever, chills, confusion, bowel/bladder active change. Review of systems reviewed from Patient History Form dated on 1/2/20 and available in the patient's chart under the Media tab. Vital Signs  Vitals:    01/02/20 1252   Resp: 17       General Exam:   Constitutional: Patient is adequately groomed with no evidence of malnutrition  Mental Status: The patient is oriented to time, place and person. The patient's mood and affect are appropriate. Lymphatic: The lymphatic examination bilaterally reveals all areas to be without enlargement or induration. Neurological: The patient has good coordination. There is no weakness or sensory deficit. Hand Examination  Inspection: There is moderate swelling and bruising but normal resting alignment of the digits    Palpation: There is tenderness along the collateral ligaments of all the digits including the index and small fingers    Range of Motion: The patient is able to demonstrate almost full range of motion of the fingers with concentric alignment of the digits    Strength: Normal    Special Tests:  There is no gross instability at the MP joints on gentle testing today    Skin: There are no additional worrisome rashes, ulcerations or lesions. Gait: normal    Circulation: well perfused    Additional Comments:     Additional Examinations:  Left Upper Extremity: Examination of the left upper extremity does not show any tenderness, deformity or injury. Range of motion is unremarkable. There is no gross instability. There are no rashes, ulcerations or lesions. Strength and tone are normal.       Radiology:     X-rays obtained and reviewed in office:  Views 3  Location right hand  Impression x-rays today demonstrate overall satisfactory congruence at the MP joints of all digits of the right hand. However also noted is significant arthritic change at the MP joints especially noted to the index long and ring fingers      Assessment: Currently satisfactory alignment after closed reduction for right long and ring finger MP dorsal dislocations, with associated osteoarthritis    Impression:   Encounter Diagnoses   Name Primary?  Right hand pain Yes    Closed dislocation of metacarpal joint of finger of right hand, initial encounter        Office Procedures:  Orders Placed This Encounter   Procedures    XR HAND RIGHT (MIN 3 VIEWS)     Standing Status:   Future     Number of Occurrences:   1     Standing Expiration Date:   1/2/2021   Uday Blanco OT - [facility]     Referral Priority:   Routine     Referral Type:   Eval and Treat     Referral Reason:   Specialty Services Required     Requested Specialty:   Occupational Therapy     Number of Visits Requested:   1       Treatment Plan: I think at this juncture she has a stable current exam and we can have her seen over in therapy for thermoplastic splint. I believe this can be made as a hand-based splint with her MP joints to the index long ring and small fingers in slight flexion or position of somewhat function and comfort. IP joints may remain free.     We can have her start working on some simple functional and self-care tasks and range of motion with the therapist.    We talked about avoidance of multiple falls and close follow-up in 2 weeks with follow-up radiographs. I do share some concern with her about long-term stiffness especially given the pre-existing osteoarthritis. Please note that this transcription was created using voice recognition software. Any errors are unintentional and may be due to voice recognition transcription.

## 2020-01-02 NOTE — PLAN OF CARE
information  []Appeared anxious/ fearful    Assessment and Plan:  Goals: [x]Patient will be able to verbalize rationale for, and demonstrate proper wearing     of splint. [x]Splint will provide proper fit and function. []Patient will be able to verbalize 2-3 ways to prevent further symptoms. [x]Patient will be able to don and doff independently. []Patient will be independent with HEP    Goals met:  [x]yes []no    Plan:  [x]Splint completed with good fit and function. Hand Therapy to follow up for     splint modifications as needed    []Splint completed; OT/PT evaluation initiated. Patient to return for further     treatment.     Denis Fiore, OTR/L, [address] [facility]

## 2020-01-16 ENCOUNTER — OFFICE VISIT (OUTPATIENT)
Dept: ORTHOPEDIC SURGERY | Age: 75
End: 2020-01-16
Payer: MEDICARE

## 2020-01-16 VITALS — HEIGHT: 61 IN | WEIGHT: 132.06 LBS | BODY MASS INDEX: 24.93 KG/M2 | RESPIRATION RATE: 16 BRPM

## 2020-01-16 PROBLEM — M19.049 HAND ARTHRITIS: Status: ACTIVE | Noted: 2020-01-16

## 2020-01-16 PROCEDURE — 99213 OFFICE O/P EST LOW 20 MIN: CPT | Performed by: ORTHOPAEDIC SURGERY

## 2020-01-16 NOTE — PROGRESS NOTES
Chief Complaint:  Hand Pain (CK RT HAND)      History of Present of Illness: The patient returns and reports that she has had some stiffness but otherwise is doing well with her thermoplastic splint. She is now approximately 3 weeks out from the hand dislocation event. Review of Systems  Pertinent items are noted in HPI  Denies fever, chills, confusion, bowel/bladder active change. Review of systems reviewed from Patient History Form dated on 1/2/2020 and available in the patient's chart under the Media tab. Examination:  On exam today she demonstrates satisfactory resting position of the fingers and almost full range of motion. There is overall good concentric alignment of the fingers as she makes a fist.  There may be a very tiny drift in an ulnar direction to the long finger which is noticeable but is correctable dynamically as she makes a fist.  Fingers are perfused and sensate. There is no extensor tendon subluxation or gross instability at the MP joints. Radiology:     X-rays obtained and reviewed in office:  Views 3  Location right hand  Impression x-rays demonstrate concentric alignment at the MP joints but there is narrowing consistent with osteoarthritis to the index and long finger metacarpal phalangeal joints as noted previously. Orders Placed This Encounter   Procedures    XR HAND RIGHT (MIN 3 VIEWS)     Standing Status:   Future     Number of Occurrences:   1     Standing Expiration Date:   1/16/2021       Impression:  Encounter Diagnoses   Name Primary?  Right hand pain Yes    Closed dislocation of metacarpal joint of finger of right hand, subsequent encounter     Hand arthritis          Treatment Plan:  I discussed with the patient the combination of her hand arthritis and her dislocations does set her up for possible stiffness. We will advance her now to simple Coban florentino taping the index and long and ring fingers and working on gentle progressive range of motion.     I will Just left office, needs an order for labs.   Scheduled an appointment for fasting labs plan to see her back for a final check in 1 month's time with x-rays at that juncture. Please note that this transcription was created using voice recognition software. Any errors are unintentional and may be due to voice recognition transcription.

## 2020-02-20 ENCOUNTER — OFFICE VISIT (OUTPATIENT)
Dept: ORTHOPEDIC SURGERY | Age: 75
End: 2020-02-20
Payer: MEDICARE

## 2020-02-20 VITALS — WEIGHT: 132.06 LBS | HEIGHT: 61 IN | BODY MASS INDEX: 24.93 KG/M2 | RESPIRATION RATE: 16 BRPM

## 2020-02-20 PROCEDURE — 99213 OFFICE O/P EST LOW 20 MIN: CPT | Performed by: ORTHOPAEDIC SURGERY

## 2020-03-11 ENCOUNTER — OFFICE VISIT (OUTPATIENT)
Dept: ORTHOPEDIC SURGERY | Age: 75
End: 2020-03-11
Payer: MEDICARE

## 2020-03-11 VITALS — HEIGHT: 61 IN | WEIGHT: 132.06 LBS | BODY MASS INDEX: 24.93 KG/M2

## 2020-03-11 PROCEDURE — 99213 OFFICE O/P EST LOW 20 MIN: CPT | Performed by: PHYSICAL MEDICINE & REHABILITATION

## 2020-03-11 RX ORDER — HYDROCODONE BITARTRATE AND ACETAMINOPHEN 5; 325 MG/1; MG/1
1 TABLET ORAL 3 TIMES DAILY PRN
Qty: 75 TABLET | Refills: 0 | Status: SHIPPED | OUTPATIENT
Start: 2020-03-11 | End: 2020-04-10

## 2020-03-11 RX ORDER — HYDROCODONE BITARTRATE AND ACETAMINOPHEN 5; 325 MG/1; MG/1
1 TABLET ORAL 3 TIMES DAILY PRN
Qty: 75 TABLET | Refills: 0 | Status: SHIPPED | OUTPATIENT
Start: 2020-05-06 | End: 2020-08-19 | Stop reason: SDUPTHER

## 2020-03-11 RX ORDER — HYDROCODONE BITARTRATE AND ACETAMINOPHEN 5; 325 MG/1; MG/1
1 TABLET ORAL 3 TIMES DAILY PRN
Qty: 75 TABLET | Refills: 0 | Status: SHIPPED | OUTPATIENT
Start: 2020-04-08 | End: 2020-05-08

## 2020-03-11 NOTE — PROGRESS NOTES
evaluation:    ORT =May 15, 2019  Pain contract May 15, 2019  Side effects: Constipation controlled with OTC meds  Pain levels currently 8/10  MED: current 10  Pain levels 4-5 with, 8 without    Work Status/Functionality: Retired    Past Medical History: Medical history form was reviewed today & scanned into the media tab  Past Medical History:   Diagnosis Date    Arthritis     Bone marrow disorder     \"sluggish bone marrow\"    Cancer ([facility] [address] breast 1990    COPD (chronic obstructive pulmonary disease) ([facility] [address]     Diabetes mellitus ([facility] [address]     Greater trochanteric bursitis of right hip 7/6/2018    Hyperlipidemia     Macular degeneration (senile) of retina       Past Surgical History:     Past Surgical History:   Procedure Laterality Date    BREAST RECONSTRUCTION      transflap reconstruction - bilateral    BREAST SURGERY  herman mastectomy   reconstruction         1990    CAPSULOTOMY Right 10/2016    COLONOSCOPY  3/14/11    FOOT FUSION      bunionectomy    FOOT SURGERY  04/25/2013    right foot surgery, toes straightened, \"lump\" removed from top of foot    HAND SURGERY      bilateral carpal tunnels    HYSTERECTOMY      JOINT REPLACEMENT      both knees    MASTECTOMY      bilateral    SHOULDER ARTHROSCOPY      both shoulders    UPPER GASTROINTESTINAL ENDOSCOPY      UPPER GASTROINTESTINAL ENDOSCOPY  9/14    dilated esophagus     Current Medications:     Current Outpatient Medications:     meloxicam (MOBIC) 15 MG tablet, Take 1 tablet by mouth daily 1 PO QD X 10 DAYS, THEN PRN, Disp: 30 tablet, Rfl: 1    gabapentin (NEURONTIN) 300 MG capsule, TAKE ONE CAPSULE BY MOUTH TWICE A DAY AND AT BEDTIME, Disp: 90 capsule, Rfl: 0    magnesium oxide (MAG-OX) 400 MG tablet, Take 400 mg by mouth daily, Disp: , Rfl:     methocarbamol (ROBAXIN) 500 MG tablet, Take 500 mg by mouth nightly , Disp: , Rfl:     Biotin 1 MG CAPS, Take 7,500 mcg by mouth daily , Disp: , Rfl:     Multiple Vitamins-Minerals (Faina Peasant PRESERVISION PO), Take by mouth 2 times daily , Disp: , Rfl:     zoledronic acid (RECLAST) 5 MG/100ML SOLN, Infuse 5 mg intravenously once Annually, Disp: , Rfl:     SPIRIVA HANDIHALER 18 MCG inhalation capsule, Inhale 18 mcg into the lungs daily , Disp: , Rfl:     albuterol sulfate  (90 BASE) MCG/ACT inhaler, Inhale 2 puffs into the lungs every 6 hours as needed for Wheezing, Disp: , Rfl:     dicyclomine (BENTYL) 10 MG capsule, Take 10 mg by mouth 4 times daily (before meals and nightly), Disp: , Rfl:     atorvastatin (LIPITOR) 10 MG tablet, Take 10 mg by mouth daily, Disp: , Rfl:     pantoprazole (PROTONIX) 40 MG tablet, Take 40 mg by mouth daily. , Disp: , Rfl:     vitamin D (CHOLECALCIFEROL) 1000 UNIT TABS tablet, Take 1,000 Units by mouth daily. , Disp: , Rfl:     Pyridoxine HCl (VITAMIN B-6) 50 MG tablet, Take 25 mg by mouth daily. , Disp: , Rfl:     Multiple Vitamins-Minerals (THERAPEUTIC MULTIVITAMIN-MINERALS) tablet, Take 1 tablet by mouth daily. , Disp: , Rfl:     calcium citrate (CALCITRATE) 950 MG tablet, Take 1 tablet by mouth daily. , Disp: , Rfl:     propranolol (INDERAL) 40 MG tablet, Take 40 mg by mouth 2 times daily. , Disp: , Rfl:     paroxetine (PAXIL) 20 MG tablet, Take 40 mg by mouth every morning., Disp: , Rfl:   Allergies:  Ergotrate [ergonovine] and Nicergoline  Social History:    reports that she quit smoking about 7 years ago. She smoked 0.50 packs per day. She has never used smokeless tobacco. She reports that she drinks alcohol. She reports that she does not use drugs.   Family History:   Family History   Problem Relation Age of Onset    Cancer Maternal Aunt        REVIEW OF SYSTEMS: Full ROS noted & scanned   CONSTITUTIONAL: Denies unexplained weight loss, fevers, chills or fatigue  NEUROLOGICAL: Denies unsteady gait or progressive weakness  MUSCULOSKELETAL: Denies joint swelling or redness  PSYCHOLOGICAL: Denies anxiety, depression   SKIN: Denies skin changes, delayed healing, rash, itching   HEMATOLOGIC: Denies easy bleeding or bruising  ENDOCRINE: Denies excessive thirst, urination, heat/cold  RESPIRATORY: Denies current dyspnea, cough  GI: Denies nausea, vomiting, diarrhea   : Denies bowel or bladder issues       PHYSICAL EXAM:    Vitals: Blood pressure 107/68, pulse 65, height 5' 0.98\" (1.549 m), weight 130 lb (59 kg). GENERAL EXAM:  · General Apparence: Patient is adequately groomed with no evidence of malnutrition. · Orientation: The patient is oriented to time, place and person. · Mood & Affect:The patient's mood and affect are appropriate   · Vascular: Examination reveals no swelling tenderness in upper or lower extremities. Good capillary refill  · Lymphatic: The lymphatic examination bilaterally reveals all areas to be without enlargement or induration  · Sensation: Sensation is intact without deficit  · Coordination/Balance: Good coordination       LUMBAR/SACRAL EXAMINATION:  · Inspection:She is scoliotic      · Palpation:   No evidence of tenderness at the midline. No tenderness bilaterally at the paraspinal or trochanters. There is no step-off or paraspinal spasm. · Range of Motion: For lumbar flexion, 20° loss of extension   · Strength:   Strength testing is 5/5 in all muscle groups tested. · Special Tests:   Straight leg raise and crossed SLR negative. Leg length and pelvis level.  0 out of 5 Meredith's signs. · Skin: There are no rashes, ulcerations or lesions. · Reflexes: Reflexes are symmetrically +1 at the patellar and ankle tendons. Clonus absent bilaterally at the feet. · Gait & station: Slightly wide-based, not spastic  · Additional Examinations: Upper extremity exam shows no evidence of myelopathy  · RIGHT LOWER EXTREMITY: Inspection/examination of the right lower extremity does not show any tenderness, deformity or injury. Range of motion is full. There is no gross instability. There are no rashes, ulcerations or lesions.  Strength and tone are normal.  ·   · LEFT LOWER EXTREMITY:  Inspection/examination of the left lower extremity does not show any tenderness, deformity or injury. Range of motion is full. There is no gross instability. There are no rashes, ulcerations or lesions. Strength and tone are normal.    Diagnostic Testin CBC, comprehensive panel reviewed    2 views lumbar spine 2019 AP and lateral show advanced lumbar DDD and spondylosis with superimposed significant scoliosis    Impression:    Lumbar DDD and spondylosis, scoliosis  Chronic back pain, medication maintenance, ORT-low risk  Subacute neck pain and gait instability    Plan:     She declines further work-up for her cervical spine    She has no overt myelopathy but has chronic gait instability. She is advised use a cane if symptoms worsen I recommend cervical MRI      Objective Goals: Physically and mentally function, carry on ADLs and achieve quality of life     Rationale for medication choice and dosage: To improve physical functionality, perform ADLS and achieve quality of life. The risks and use of maintenance opiate medication were reviewed. These include the risk of tolerance, addition, and abuse. Potential side effects were also discussed. Informed verbal consent was obtained. Medications are to be taken as prescribed and not escalated without prior agreement. OASEANS/LORI reviewed & appropriate. Opiate medications will only be prescribed through the office of JULIETA Rodriguez unless notified otherwise         Goals of the current treatment regimen include: improvement in pain, improvement in overall in physical performance, ability to perform daily activities, work or disability, emotional distress, health care utilization and decreased medication consumption. Progress will be monitored towards achieving/maintaining therapeutic goals:    1.  Improving perceived interference of pain with ADL's, ability to perform HEP, continue to improve in overall flexibility, ROM, strength and endurance, ability to do household activities indoor and/or outdoor, work and social/leisure activities. Improve psychosocial and physical functioning. 2. Improving sleep to 6-7 hours a night. Improve mood/ anxiety and depression symptoms    3. Reduction of reliance on opioid analgesia/more appropriate opioid use. Utilization of adjuvant medications as appropriate. Risks and benefits of the medications and alternative treatments have been discussed with the patient. The patient was advised against drinking alcohol with the opioid pain medicines, advised against driving or handling machinery when starting or adjusting the dose of medicines, feeling groggy or drowsy, or if having any cognitive issues related to the current medications. The patient is fully aware of the risk of overdose and death, if medicines are misused and not taken as prescribed. Discussed patient's responsibility to safely store and appropriately dispose up medication. Prescription for Narcan offered. If the patient develops new symptoms or if the symptoms worsen, the patient was told to call the office. Orders Placed This Encounter   Medications    HYDROcodone-acetaminophen (NORCO) 5-325 MG per tablet     Sig: Take 1 tablet by mouth 3 times daily as needed for Pain for up to 30 days. Dispense:  75 tablet     Refill:  0     Reduce doses taken as pain becomes manageable    HYDROcodone-acetaminophen (NORCO) 5-325 MG per tablet     Sig: Take 1 tablet by mouth 3 times daily as needed for Pain for up to 30 days. Dispense:  75 tablet     Refill:  0     Reduce doses taken as pain becomes manageable    HYDROcodone-acetaminophen (NORCO) 5-325 MG per tablet     Sig: Take 1 tablet by mouth 3 times daily as needed for Pain for up to 30 days.      Dispense:  75 tablet     Refill:  0     Reduce doses taken as pain becomes manageable         OBEY Elena

## 2020-05-20 ENCOUNTER — OFFICE VISIT (OUTPATIENT)
Dept: ORTHOPEDIC SURGERY | Age: 75
End: 2020-05-20
Payer: MEDICARE

## 2020-05-20 VITALS — BODY MASS INDEX: 24.93 KG/M2 | WEIGHT: 132.06 LBS | HEIGHT: 61 IN

## 2020-05-20 PROCEDURE — 99213 OFFICE O/P EST LOW 20 MIN: CPT | Performed by: PHYSICAL MEDICINE & REHABILITATION

## 2020-05-20 RX ORDER — HYDROCODONE BITARTRATE AND ACETAMINOPHEN 5; 325 MG/1; MG/1
1 TABLET ORAL 3 TIMES DAILY PRN
Qty: 75 TABLET | Refills: 0 | Status: SHIPPED | OUTPATIENT
Start: 2020-07-16 | End: 2020-08-19 | Stop reason: SDUPTHER

## 2020-05-20 RX ORDER — HYDROCODONE BITARTRATE AND ACETAMINOPHEN 5; 325 MG/1; MG/1
1 TABLET ORAL 3 TIMES DAILY PRN
Qty: 75 TABLET | Refills: 0 | Status: SHIPPED | OUTPATIENT
Start: 2020-06-18 | End: 2020-08-19

## 2020-05-20 RX ORDER — HYDROCODONE BITARTRATE AND ACETAMINOPHEN 5; 325 MG/1; MG/1
1 TABLET ORAL 3 TIMES DAILY PRN
Qty: 90 TABLET | Refills: 0 | Status: CANCELLED | OUTPATIENT
Start: 2020-05-20 | End: 2020-06-19

## 2020-08-19 ENCOUNTER — OFFICE VISIT (OUTPATIENT)
Dept: ORTHOPEDIC SURGERY | Age: 75
End: 2020-08-19
Payer: MEDICARE

## 2020-08-19 VITALS — BODY MASS INDEX: 25.91 KG/M2 | HEIGHT: 60 IN | WEIGHT: 132 LBS

## 2020-08-19 PROCEDURE — 99213 OFFICE O/P EST LOW 20 MIN: CPT | Performed by: PHYSICAL MEDICINE & REHABILITATION

## 2020-08-19 RX ORDER — HYDROCODONE BITARTRATE AND ACETAMINOPHEN 5; 325 MG/1; MG/1
1 TABLET ORAL 3 TIMES DAILY PRN
Qty: 75 TABLET | Refills: 0 | Status: SHIPPED | OUTPATIENT
Start: 2020-09-18 | End: 2020-10-21 | Stop reason: SDUPTHER

## 2020-08-19 RX ORDER — HYDROCODONE BITARTRATE AND ACETAMINOPHEN 5; 325 MG/1; MG/1
1 TABLET ORAL 3 TIMES DAILY PRN
Qty: 75 TABLET | Refills: 0 | Status: SHIPPED | OUTPATIENT
Start: 2020-08-20 | End: 2020-10-21 | Stop reason: SDUPTHER

## 2020-08-19 RX ORDER — HYDROCODONE BITARTRATE AND ACETAMINOPHEN 5; 325 MG/1; MG/1
1 TABLET ORAL 3 TIMES DAILY PRN
Qty: 75 TABLET | Refills: 0 | Status: SHIPPED | OUTPATIENT
Start: 2020-10-16 | End: 2020-10-21 | Stop reason: SDUPTHER

## 2020-08-19 NOTE — PROGRESS NOTES
Follow-up back pain and medication maintenance: SPINE    CHIEF COMPLAINT:    Chief Complaint   Patient presents with    Back Pain     F/u LBP       HISTORY OF PRESENT ILLNESS:                The patient is a 75 y.o. female result for lumbar stenosis and scoliosis. She is here for her medication maintenance for chronic back pain. She is reports tolerable back pain with improvements with hydrocodone 5 mg 2-3 times a day daily. She has some mild instability with her gait but it is not new or progressive. She reports no worsening or frequent falls but did fall once recently because she is clumsy walking her dog. She has no new worsening coordination loss no new upper extremity symptoms. She sleeps up to 6 hours a night. She remains independent and lives alone      Past Medical History:   Diagnosis Date    Arthritis     Bone marrow disorder     \"sluggish bone marrow\"    Cancer ([facility] [address] breast 1990    COPD (chronic obstructive pulmonary disease) (HCC)     Diabetes mellitus ([facility] [address]     Greater trochanteric bursitis of right hip 7/6/2018    Hyperlipidemia     Macular degeneration (senile) of retina           Pain Assessment  Location of Pain: Back  Quality of Pain: Throbbing, Sharp, Dull, Aching  Duration of Pain: Persistent  Frequency of Pain: Constant  Aggravating Factors: Walking, Standing(sitting)  Limiting Behavior: Yes  Relieving Factors: Rest  Result of Injury: No  Work-Related Injury: No  Are there other pain locations you wish to document?: No    The pain assessment was noted & reviewed in the medical record today.      Current/Past Treatment:   · Physical Therapy:   · Chiropractic:     · Injection:  2016 LUIS  with me   Medications:            NSAIDS: Past             Muscle relaxer:              Steriods:              Neuropathic medications past gabapentin          Opioids: Hydrocodone 5 mg twice daily occasionally 3 times daily            Other:   · Surgery/Consult:    Pain medication by mouth 2 times daily , Disp: , Rfl:     zoledronic acid (RECLAST) 5 MG/100ML SOLN, Infuse 5 mg intravenously once Annually, Disp: , Rfl:     SPIRIVA HANDIHALER 18 MCG inhalation capsule, Inhale 18 mcg into the lungs daily , Disp: , Rfl:     albuterol sulfate  (90 BASE) MCG/ACT inhaler, Inhale 2 puffs into the lungs every 6 hours as needed for Wheezing, Disp: , Rfl:     dicyclomine (BENTYL) 10 MG capsule, Take 10 mg by mouth 4 times daily (before meals and nightly), Disp: , Rfl:     atorvastatin (LIPITOR) 10 MG tablet, Take 10 mg by mouth daily, Disp: , Rfl:     pantoprazole (PROTONIX) 40 MG tablet, Take 40 mg by mouth daily. , Disp: , Rfl:     vitamin D (CHOLECALCIFEROL) 1000 UNIT TABS tablet, Take 1,000 Units by mouth daily. , Disp: , Rfl:     Pyridoxine HCl (VITAMIN B-6) 50 MG tablet, Take 25 mg by mouth daily. , Disp: , Rfl:     Multiple Vitamins-Minerals (THERAPEUTIC MULTIVITAMIN-MINERALS) tablet, Take 1 tablet by mouth daily. , Disp: , Rfl:     calcium citrate (CALCITRATE) 950 MG tablet, Take 1 tablet by mouth daily. , Disp: , Rfl:     propranolol (INDERAL) 40 MG tablet, Take 40 mg by mouth 2 times daily. , Disp: , Rfl:     paroxetine (PAXIL) 20 MG tablet, Take 40 mg by mouth every morning., Disp: , Rfl:   Allergies:  Ergotrate [ergonovine] and Nicergoline  Social History:    reports that she quit smoking about 7 years ago. She smoked 0.50 packs per day. She has never used smokeless tobacco. She reports that she drinks alcohol. She reports that she does not use drugs.   Family History:   Family History   Problem Relation Age of Onset    Cancer Maternal Aunt        REVIEW OF SYSTEMS: Full ROS noted & scanned   CONSTITUTIONAL: Denies unexplained weight loss, fevers, chills or fatigue  NEUROLOGICAL: Denies unsteady gait or progressive weakness  MUSCULOSKELETAL: Denies joint swelling or redness  PSYCHOLOGICAL: Denies anxiety, depression   SKIN: Denies skin changes, delayed healing, rash, itching Strength and tone are normal.  ·   · LEFT LOWER EXTREMITY:  Inspection/examination of the left lower extremity does not show any tenderness, deformity or injury. Range of motion is full. There is no gross instability. There are no rashes, ulcerations or lesions. Strength and tone are normal.    Diagnostic Testin CBC, comprehensive panel reviewed    2 views lumbar spine 2019 AP and lateral show advanced lumbar DDD and spondylosis with superimposed significant scoliosis    Impression:    Lumbar DDD and spondylosis, scoliosis  Chronic back pain, medication maintenance, ORT-low risk  Subacute neck pain and gait instability-not progressive, no evidence of myelopathy on exam but    Plan:    She will be followed with telemedicine and she will see us yearly in the clinic for medication maintenance    She will begin using a cane for stability    She denies any use or abuse of alcohol    Orders Placed This Encounter   Medications    HYDROcodone-acetaminophen (NORCO) 5-325 MG per tablet     Sig: Take 1 tablet by mouth 3 times daily as needed for Pain for up to 30 days. Take lowest dose possible to manage pain     Dispense:  75 tablet     Refill:  0     Reduce doses taken as pain becomes manageable    HYDROcodone-acetaminophen (NORCO) 5-325 MG per tablet     Sig: Take 1 tablet by mouth 3 times daily as needed for Pain for up to 30 days. Take lowest dose possible to manage pain     Dispense:  75 tablet     Refill:  0     Reduce doses taken as pain becomes manageable    HYDROcodone-acetaminophen (NORCO) 5-325 MG per tablet     Sig: Take 1 tablet by mouth 3 times daily as needed for Pain for up to 30 days. Dispense:  75 tablet     Refill:  0     Reduce doses taken as pain becomes manageable     Orders Placed This Encounter   Medications    HYDROcodone-acetaminophen (NORCO) 5-325 MG per tablet     Sig: Take 1 tablet by mouth 3 times daily as needed for Pain for up to 30 days.  Take lowest dose possible to manage pain     Dispense:  75 tablet     Refill:  0     Reduce doses taken as pain becomes manageable    HYDROcodone-acetaminophen (NORCO) 5-325 MG per tablet     Sig: Take 1 tablet by mouth 3 times daily as needed for Pain for up to 30 days. Take lowest dose possible to manage pain     Dispense:  75 tablet     Refill:  0     Reduce doses taken as pain becomes manageable    HYDROcodone-acetaminophen (NORCO) 5-325 MG per tablet     Sig: Take 1 tablet by mouth 3 times daily as needed for Pain for up to 30 days.      Dispense:  75 tablet     Refill:  0     Reduce doses taken as pain becomes manageable         OBEY Shantelping Candelario

## 2020-08-25 ENCOUNTER — OFFICE VISIT (OUTPATIENT)
Dept: ORTHOPEDIC SURGERY | Age: 75
End: 2020-08-25
Payer: MEDICARE

## 2020-08-25 VITALS — HEIGHT: 61 IN | BODY MASS INDEX: 24.92 KG/M2 | WEIGHT: 132 LBS

## 2020-08-25 PROCEDURE — 99203 OFFICE O/P NEW LOW 30 MIN: CPT | Performed by: PODIATRIST

## 2020-09-15 ENCOUNTER — TELEPHONE (OUTPATIENT)
Dept: ORTHOPEDIC SURGERY | Age: 75
End: 2020-09-15

## 2020-10-21 ENCOUNTER — OFFICE VISIT (OUTPATIENT)
Dept: ORTHOPEDIC SURGERY | Age: 75
End: 2020-10-21
Payer: MEDICARE

## 2020-10-21 VITALS — BODY MASS INDEX: 25.91 KG/M2 | WEIGHT: 132 LBS | HEIGHT: 60 IN

## 2020-10-21 PROBLEM — M15.9 GENERALIZED OSTEOARTHROSIS, UNSPECIFIED SITE: Status: ACTIVE | Noted: 2020-10-21

## 2020-10-21 PROBLEM — Z53.09 ASPIRIN CONTRAINDICATED: Status: ACTIVE | Noted: 2018-01-11

## 2020-10-21 PROBLEM — E78.2 MIXED HYPERLIPIDEMIA: Status: ACTIVE | Noted: 2020-10-21

## 2020-10-21 PROBLEM — D46.9 MYELODYSPLASTIC SYNDROME (HCC): Status: ACTIVE | Noted: 2020-10-21

## 2020-10-21 PROBLEM — J30.9 ALLERGIC RHINITIS: Status: ACTIVE | Noted: 2020-10-21

## 2020-10-21 PROBLEM — K21.9 ESOPHAGEAL REFLUX: Status: ACTIVE | Noted: 2020-10-21

## 2020-10-21 PROBLEM — M20.42 HAMMER TOE OF SECOND TOE OF LEFT FOOT: Status: ACTIVE | Noted: 2020-10-21

## 2020-10-21 PROCEDURE — 99213 OFFICE O/P EST LOW 20 MIN: CPT | Performed by: PHYSICAL MEDICINE & REHABILITATION

## 2020-10-21 RX ORDER — HYDROCODONE BITARTRATE AND ACETAMINOPHEN 5; 325 MG/1; MG/1
1 TABLET ORAL 2 TIMES DAILY PRN
Qty: 45 TABLET | Refills: 0 | Status: SHIPPED | OUTPATIENT
Start: 2020-12-17 | End: 2021-01-16

## 2020-10-21 RX ORDER — PAROXETINE HYDROCHLORIDE 40 MG/1
TABLET, FILM COATED ORAL
COMMUNITY
Start: 2020-10-18

## 2020-10-21 RX ORDER — HYDROCODONE BITARTRATE AND ACETAMINOPHEN 5; 325 MG/1; MG/1
1 TABLET ORAL 2 TIMES DAILY PRN
Qty: 45 TABLET | Refills: 0 | Status: SHIPPED | OUTPATIENT
Start: 2020-11-19 | End: 2020-12-19

## 2020-10-21 RX ORDER — HYDROCODONE BITARTRATE AND ACETAMINOPHEN 5; 325 MG/1; MG/1
1 TABLET ORAL 2 TIMES DAILY PRN
Qty: 45 TABLET | Refills: 0 | Status: SHIPPED | OUTPATIENT
Start: 2020-10-21 | End: 2020-11-20

## 2020-10-21 NOTE — PROGRESS NOTES
Neuropathic medications past gabapentin          Opioids: Hydrocodone 5 mg twice daily occasionally 3 times daily-some side effects 3 times daily dosing            Other:   · Surgery/Consult:    Pain medication evaluation:    ORT = Aug 2020; ort=1  Pain contract Aug 2020  Side effects: Constipation controlled with OTC meds  Pain levels without 8/10, 4-5/10 with medicine   MED: 10-15      Work Status/Functionality: Retired    Past Medical History: Medical history form was reviewed today & scanned into the media tab  Past Medical History:   Diagnosis Date    Arthritis     Bone marrow disorder     \"sluggish bone marrow\"    [facility] ([facility] [address] breast 1990    COPD (chronic obstructive pulmonary disease) ([facility] [address]     Diabetes mellitus ([facility] [address]     Greater trochanteric bursitis of right hip 7/6/2018    Hyperlipidemia     Macular degeneration (senile) of retina       Past Surgical History:     Past Surgical History:   Procedure Laterality Date    BREAST RECONSTRUCTION      transflap reconstruction - bilateral    BREAST SURGERY  herman mastectomy   reconstruction         1990    CAPSULOTOMY Right 10/2016    COLONOSCOPY  3/14/11    FOOT FUSION      bunionectomy    FOOT SURGERY  04/25/2013    right foot surgery, toes straightened, \"lump\" removed from top of foot    HAND SURGERY      bilateral carpal tunnels    HYSTERECTOMY      JOINT REPLACEMENT      both knees    MASTECTOMY      bilateral    SHOULDER ARTHROSCOPY      both shoulders    UPPER GASTROINTESTINAL ENDOSCOPY      UPPER GASTROINTESTINAL ENDOSCOPY  9/14    dilated esophagus     Current Medications:     Current Outpatient Medications:     meloxicam (MOBIC) 15 MG tablet, Take 1 tablet by mouth daily 1 PO QD X 10 DAYS, THEN PRN, Disp: 30 tablet, Rfl: 1    gabapentin (NEURONTIN) 300 MG capsule, TAKE ONE CAPSULE BY MOUTH TWICE A DAY AND AT BEDTIME, Disp: 90 capsule, Rfl: 0    magnesium oxide (MAG-OX) 400 MG tablet, Take 400 mg by mouth daily, Disp: , Rfl:     methocarbamol (ROBAXIN) 500 MG tablet, Take 500 mg by mouth nightly , Disp: , Rfl:     Biotin 1 MG CAPS, Take 7,500 mcg by mouth daily , Disp: , Rfl:     Multiple Vitamins-Minerals (OCUVITE PRESERVISION PO), Take by mouth 2 times daily , Disp: , Rfl:     zoledronic acid (RECLAST) 5 MG/100ML SOLN, Infuse 5 mg intravenously once Annually, Disp: , Rfl:     SPIRIVA HANDIHALER 18 MCG inhalation capsule, Inhale 18 mcg into the lungs daily , Disp: , Rfl:     albuterol sulfate  (90 BASE) MCG/ACT inhaler, Inhale 2 puffs into the lungs every 6 hours as needed for Wheezing, Disp: , Rfl:     dicyclomine (BENTYL) 10 MG capsule, Take 10 mg by mouth 4 times daily (before meals and nightly), Disp: , Rfl:     atorvastatin (LIPITOR) 10 MG tablet, Take 10 mg by mouth daily, Disp: , Rfl:     pantoprazole (PROTONIX) 40 MG tablet, Take 40 mg by mouth daily. , Disp: , Rfl:     vitamin D (CHOLECALCIFEROL) 1000 UNIT TABS tablet, Take 1,000 Units by mouth daily. , Disp: , Rfl:     Pyridoxine HCl (VITAMIN B-6) 50 MG tablet, Take 25 mg by mouth daily. , Disp: , Rfl:     Multiple Vitamins-Minerals (THERAPEUTIC MULTIVITAMIN-MINERALS) tablet, Take 1 tablet by mouth daily. , Disp: , Rfl:     calcium citrate (CALCITRATE) 950 MG tablet, Take 1 tablet by mouth daily. , Disp: , Rfl:     propranolol (INDERAL) 40 MG tablet, Take 40 mg by mouth 2 times daily. , Disp: , Rfl:     paroxetine (PAXIL) 20 MG tablet, Take 40 mg by mouth every morning., Disp: , Rfl:   Allergies:  Ergotrate [ergonovine] and Nicergoline  Social History:    reports that she quit smoking about 7 years ago. She smoked 0.50 packs per day. She has never used smokeless tobacco. She reports that she drinks alcohol. She reports that she does not use drugs.   Family History:   Family History   Problem Relation Age of Onset    Cancer Maternal Aunt        REVIEW OF SYSTEMS: Full ROS noted & scanned   CONSTITUTIONAL: Denies unexplained weight loss, fevers, chills or Goals of the current treatment regimen include: improvement in pain, improvement in overall in physical performance, ability to perform daily activities, work or disability, emotional distress, health care utilization and decreased medication consumption. Progress will be monitored towards achieving/maintaining therapeutic goals:    1. Improving perceived interference of pain with ADL's, ability to perform HEP, continue to improve in overall flexibility, ROM, strength and endurance, ability to do household activities indoor and/or outdoor, work and social/leisure activities. Improve psychosocial and physical functioning. 2. Improving sleep to 6-7 hours a night. Improve mood/ anxiety and depression symptoms    3. Reduction of reliance on opioid analgesia/more appropriate opioid use. Utilization of adjuvant medications as appropriate. Risks and benefits of the medications and alternative treatments have been discussed with the patient. The patient was advised against drinking alcohol with the opioid pain medicines, advised against driving or handling machinery when starting or adjusting the dose of medicines, feeling groggy or drowsy, or if having any cognitive issues related to the current medications. The patient is fully aware of the risk of overdose and death, if medicines are misused and not taken as prescribed. Discussed patient's responsibility to safely store and appropriately dispose up medication. Prescription for Narcan offered. If the patient develops new symptoms or if the symptoms worsen, the patient was told to call the office.           Stephanie Segundo

## 2021-01-15 ENCOUNTER — OFFICE VISIT (OUTPATIENT)
Dept: ORTHOPEDIC SURGERY | Age: 76
End: 2021-01-15
Payer: MEDICARE

## 2021-01-15 VITALS — HEIGHT: 60 IN | BODY MASS INDEX: 25.91 KG/M2 | WEIGHT: 132 LBS

## 2021-01-15 DIAGNOSIS — M41.25 OTHER IDIOPATHIC SCOLIOSIS, THORACOLUMBAR REGION: ICD-10-CM

## 2021-01-15 DIAGNOSIS — M47.816 LUMBAR SPONDYLOSIS: ICD-10-CM

## 2021-01-15 DIAGNOSIS — M51.36 DDD (DEGENERATIVE DISC DISEASE), LUMBAR: ICD-10-CM

## 2021-01-15 PROCEDURE — 99214 OFFICE O/P EST MOD 30 MIN: CPT | Performed by: PHYSICAL MEDICINE & REHABILITATION

## 2021-01-15 RX ORDER — HYDROCODONE BITARTRATE AND ACETAMINOPHEN 5; 325 MG/1; MG/1
1 TABLET ORAL 2 TIMES DAILY PRN
Qty: 45 TABLET | Refills: 0 | Status: CANCELLED | OUTPATIENT
Start: 2021-01-15 | End: 2021-02-14

## 2021-01-15 RX ORDER — HYDROCODONE BITARTRATE AND ACETAMINOPHEN 5; 325 MG/1; MG/1
1 TABLET ORAL 2 TIMES DAILY PRN
Qty: 45 TABLET | Refills: 0 | Status: CANCELLED | OUTPATIENT
Start: 2021-03-13 | End: 2021-04-12

## 2021-01-15 RX ORDER — HYDROCODONE BITARTRATE AND ACETAMINOPHEN 5; 325 MG/1; MG/1
1 TABLET ORAL 2 TIMES DAILY PRN
Qty: 45 TABLET | Refills: 0 | Status: CANCELLED | OUTPATIENT
Start: 2021-02-13 | End: 2021-03-15

## 2021-01-15 NOTE — PROGRESS NOTES
Follow-up back pain and medication maintenance: SPINE    CHIEF COMPLAINT:    Chief Complaint   Patient presents with    Back Pain     F/u LBP       HISTORY OF PRESENT ILLNESS:                The patient is a 75 y.o. female result for lumbar stenosis and scoliosis. She is here for her medication maintenance for chronic back pain. Followed her for low-dose opioids. We decreased her medicine to 5 mg a day and half a milligram nightly. Unfortunately she had no pain relief. We reduced her dose to 2 side effects and confusion at higher doses. She continues have right mechanical back and buttock pain with standing or walking    Past Medical History:   Diagnosis Date    Arthritis     Bone marrow disorder     \"sluggish bone marrow\"    Cancer ([facility] [address] breast 1990    COPD (chronic obstructive pulmonary disease) (HCC)     Diabetes mellitus ([facility] [address]     Greater trochanteric bursitis of right hip 7/6/2018    Hyperlipidemia     Macular degeneration (senile) of retina           Pain Assessment  Location of Pain: Back  Quality of Pain: Throbbing, Sharp, Dull, Aching  Duration of Pain: Persistent  Frequency of Pain: Constant  Aggravating Factors: Walking, Standing(sitting)  Limiting Behavior: Yes  Relieving Factors: Rest  Result of Injury: No  Work-Related Injury: No  Are there other pain locations you wish to document?: No    The pain assessment was noted & reviewed in the medical record today.      Current/Past Treatment:   · Physical Therapy:   · Chiropractic:     · Injection:  2016 LUIS  with me   Medications:            NSAIDS: Past             Muscle relaxer:              Steriods:              Neuropathic medications past gabapentin          Opioids: Hydrocodone 5 mg today and 2.5 mg nightly             Other:   · Surgery/Consult:    Pain medication evaluation:    ORT = Aug 2020; ort=1  Pain contract Aug 2020  Side effects: Constipation controlled with OTC meds  Pain levels without 8/10, 4-5/10 with medicine MED: 4951      Work Status/Functionality: Retired    Past Medical History: Medical history form was reviewed today & scanned into the media tab  Past Medical History:   Diagnosis Date    Arthritis     Bone marrow disorder     \"sluggish bone marrow\"    Cancer ([facility] [address] breast 1990    COPD (chronic obstructive pulmonary disease) ([facility] [address]     Diabetes mellitus ([facility] [address]     Greater trochanteric bursitis of right hip 7/6/2018    Hyperlipidemia     Macular degeneration (senile) of retina       Past Surgical History:     Past Surgical History:   Procedure Laterality Date    BREAST RECONSTRUCTION      transflap reconstruction - bilateral    BREAST SURGERY  herman mastectomy   reconstruction         1990    CAPSULOTOMY Right 10/2016    COLONOSCOPY  3/14/11    FOOT FUSION      bunionectomy    FOOT SURGERY  04/25/2013    right foot surgery, toes straightened, \"lump\" removed from top of foot    HAND SURGERY      bilateral carpal tunnels    HYSTERECTOMY      JOINT REPLACEMENT      both knees    MASTECTOMY      bilateral    SHOULDER ARTHROSCOPY      both shoulders    UPPER GASTROINTESTINAL ENDOSCOPY      UPPER GASTROINTESTINAL ENDOSCOPY  9/14    dilated esophagus     Current Medications:     Current Outpatient Medications:     meloxicam (MOBIC) 15 MG tablet, Take 1 tablet by mouth daily 1 PO QD X 10 DAYS, THEN PRN, Disp: 30 tablet, Rfl: 1    gabapentin (NEURONTIN) 300 MG capsule, TAKE ONE CAPSULE BY MOUTH TWICE A DAY AND AT BEDTIME, Disp: 90 capsule, Rfl: 0    magnesium oxide (MAG-OX) 400 MG tablet, Take 400 mg by mouth daily, Disp: , Rfl:     methocarbamol (ROBAXIN) 500 MG tablet, Take 500 mg by mouth nightly , Disp: , Rfl:     Biotin 1 MG CAPS, Take 7,500 mcg by mouth daily , Disp: , Rfl:     Multiple Vitamins-Minerals (OCUVITE PRESERVISION PO), Take by mouth 2 times daily , Disp: , Rfl:     zoledronic acid (RECLAST) 5 MG/100ML SOLN, Infuse 5 mg intravenously once Annually, Disp: , Rfl:   SPIRIVA HANDIHALER 18 MCG inhalation capsule, Inhale 18 mcg into the lungs daily , Disp: , Rfl:     albuterol sulfate  (90 BASE) MCG/ACT inhaler, Inhale 2 puffs into the lungs every 6 hours as needed for Wheezing, Disp: , Rfl:     dicyclomine (BENTYL) 10 MG capsule, Take 10 mg by mouth 4 times daily (before meals and nightly), Disp: , Rfl:     atorvastatin (LIPITOR) 10 MG tablet, Take 10 mg by mouth daily, Disp: , Rfl:     pantoprazole (PROTONIX) 40 MG tablet, Take 40 mg by mouth daily. , Disp: , Rfl:     vitamin D (CHOLECALCIFEROL) 1000 UNIT TABS tablet, Take 1,000 Units by mouth daily. , Disp: , Rfl:     Pyridoxine HCl (VITAMIN B-6) 50 MG tablet, Take 25 mg by mouth daily. , Disp: , Rfl:     Multiple Vitamins-Minerals (THERAPEUTIC MULTIVITAMIN-MINERALS) tablet, Take 1 tablet by mouth daily. , Disp: , Rfl:     calcium citrate (CALCITRATE) 950 MG tablet, Take 1 tablet by mouth daily. , Disp: , Rfl:     propranolol (INDERAL) 40 MG tablet, Take 40 mg by mouth 2 times daily. , Disp: , Rfl:     paroxetine (PAXIL) 20 MG tablet, Take 40 mg by mouth every morning., Disp: , Rfl:   Allergies:  Ergotrate [ergonovine] and Nicergoline  Social History:    reports that she quit smoking about 7 years ago. She smoked 0.50 packs per day. She has never used smokeless tobacco. She reports that she drinks alcohol. She reports that she does not use drugs.   Family History:   Family History   Problem Relation Age of Onset    Cancer Maternal Aunt        REVIEW OF SYSTEMS: Full ROS noted & scanned   CONSTITUTIONAL: Denies unexplained weight loss, fevers, chills or fatigue  NEUROLOGICAL: Denies unsteady gait or progressive weakness  MUSCULOSKELETAL: Denies joint swelling or redness  PSYCHOLOGICAL: Denies anxiety, depression   SKIN: Denies skin changes, delayed healing, rash, itching   HEMATOLOGIC: Denies easy bleeding or bruising  ENDOCRINE: Denies excessive thirst, urination, heat/cold RESPIRATORY: Denies current dyspnea, cough  GI: Denies nausea, vomiting, diarrhea   : Denies bowel or bladder issues       PHYSICAL EXAM:    Vitals: Blood pressure 107/68, pulse 65, height 5' 0.98\" (1.549 m), weight 130 lb (59 kg). GENERAL EXAM:  · General Apparence: Patient is adequately groomed with no evidence of malnutrition. · Orientation: The patient is oriented to time, place and person. · Mood & Affect:The patient's mood and affect are appropriate   · Vascular: Examination reveals no swelling tenderness in upper or lower extremities. Good capillary refill  · Lymphatic: The lymphatic examination bilaterally reveals all areas to be without enlargement or induration  · Sensation: Sensation is intact without deficit  · Coordination/Balance: Good coordination       LUMBAR/SACRAL EXAMINATION:  · Inspection:She is scoliotic    and kyphotic  · Palpation:   No evidence of tenderness at the midline. No tenderness bilaterally at the paraspinal or trochanters. There is no step-off or paraspinal spasm. · Range of Motion: No severe pain with flexion extension  · Strength:   Strength testing is+4/5 in all muscle groups tested. · Special Tests: Positive Caitlin's finger test on the right; positive Elizabeth's testing on the right, +gaenslin's  · Skin: There are no rashes, ulcerations or lesions. · Reflexes: Reflexes are symmetrically trace at the patellar and ankle tendons. Clonus absent bilaterally at the feet. · Gait & station: Slightly wide-based, not spastic  · Additional Examinations: RIGHT LOWER EXTREMITY: Inspection/examination of the right lower extremity does not show any tenderness, deformity or injury. Range of motion is full. There is no gross instability. There are no rashes, ulcerations or lesions.  Strength and tone are normal.  · · LEFT LOWER EXTREMITY:  Inspection/examination of the left lower extremity does not show any tenderness, deformity or injury. Range of motion is full. There is no gross instability. There are no rashes, ulcerations or lesions.   Strength and tone are normal.    Diagnostic Testin CBC, comprehensive panel reviewed    2 views lumbar spine 1/15/2021 again show advanced discogenic spondylosis nickel significant dextroscoliosis; no focal abnormalities of the right sacroiliac joint    2 views lumbar spine 2019 AP and lateral show advanced lumbar DDD and spondylosis with superimposed significant scoliosis    Impression:    Right sacroiliac pain  Lumbar DDD and spondylosis, scoliosis  Chronic back pain, medication maintenance, ORTlow risk    Plan:    She has sacroiliac related pain    We will stop her hydrocodone as it is not helping    Plan diagnostic and therapeutic right intra-articular sacroiliac injection    OBEY Tong

## 2021-01-15 NOTE — LETTER
[facility]  Surgery Precert & Billing Form:    DEMOGRAPHICS:                                                                                                       Patient Name:  Otoniel Melgar  Patient :  1945   Patient SS#:      Patient Phone:  [phone] (home)  Alt.  Patient Phone:    Patient Address:  [address] [address]    PCP:  Iamni Pinedo MD  Insurance: [employer] Joaquin    DIAGNOSIS & PROCEDURE:                                                                                      Diagnosis: M46.1  Operation: RIGHT SIJ INJ    SURGERY  INFORMATION  Date of Surgery:   2021  Location: [facility]  Type:    OUTPATIENT  23 hour hold:  NO  Surgeon:          Nita Perry MD  1/15/21     BILLING INFORMATION:                                                                                                Physician Procedure                                            CPT Codes        Right SACROILIAC JOINT INJECTION   33224                  PA, or Fellow Procedure                                      CPT Codes

## 2021-01-15 NOTE — LETTER
[facility]    Please Schedule the following with: Dr. Jeffrey Nuñez    Date:  1/15/21     Patient: Natalie Nguyen     YOB: 1945    Patient Home Phone: [phone] (home)    Diagnosis: Right sacroiliac pain M46.1    []LT     [x]RT     []SUMEET     []Midline    Levels: Diagnostic and therapeutic right sacroiliac intra-articular injection    []Cervical LUIS 50539, 14362  []L-MBB 25180, 67085  [x]SI Joint 26683   []C-FACET 08713, 36574, 94986  []L-FACET 07955, 80511  []Interlaminar LUIS 68357     []HIP 27139    []C-MBB  []Transforaminal LUIS 69645  []Neurotomy 74918, 49797, 81174    Attending Physician: Magaly Raygoza    Injection Schedule for: 1/26/2021 AT 1245PM      At[facility] [x[facility]   [facility]    First Insurance: [place]                       Pre-cert #:  Second Insurance:                 Pre-cert #:    Comments:    1/25/16: Midline L5/S1 LESI   4/26/16: Midline L5/S1 LESI  8/16/16: Rt of Midline L5/S1 LESI  SEDATION:       [] IV           [] ORAL    [] Blood Thinner:                 []Diabetic           []Antibiotic:               []Glaucoma:    [] Pacemaker/defib       [] Current Open Wounds, Lacerations or Sores     Allergies:    Allergies   Allergen Reactions    Ergotrate [Ergonovine] Other (See Comments)     High b/p & h/a    Nicergoline      Other reaction(s): Hypertension       Past Medical History:   Diagnosis Date    Arthritis     Bone marrow disorder     \"sluggish bone marrow\"    Cancer [facility]) breast 1990    COPD (chronic obstructive pulmonary disease) ([address]     Degeneration of cervical intervertebral disc 6/4/2012    Diabetes mellitus ([facility] [address]     Greater trochanteric bursitis of right hip 7/6/2018    Hyperlipidemia     Kyphoscoliosis and scoliosis 10/12/2009    Macular degeneration (senile) of retina         Current Outpatient Medications   Medication Sig Dispense Refill  PARoxetine (PAXIL) 40 MG tablet       HYDROcodone-acetaminophen (NORCO) 5-325 MG per tablet Take 1 tablet by mouth 2 times daily as needed for Pain for up to 30 days. 1/2 TABLET QD AND 1 TABLET AT BEDTIME 45 tablet 0    hydrOXYzine (ATARAX) 25 MG tablet Take 12.5-25 mg by mouth      cycloSPORINE (RESTASIS) 0.05 % ophthalmic emulsion 1 drop      meloxicam (MOBIC) 15 MG tablet Take 1 tablet by mouth daily 1 PO QD X 10 DAYS, THEN PRN 30 tablet 1    gabapentin (NEURONTIN) 300 MG capsule TAKE ONE CAPSULE BY MOUTH TWICE A DAY AND AT BEDTIME 90 capsule 0    magnesium oxide (MAG-OX) 400 MG tablet Take 400 mg by mouth daily      methocarbamol (ROBAXIN) 500 MG tablet Take 500 mg by mouth nightly       Biotin 1 MG CAPS Take 7,500 mcg by mouth daily       Multiple Vitamins-Minerals (OCUVITE PRESERVISION PO) Take by mouth 2 times daily       zoledronic acid (RECLAST) 5 MG/100ML SOLN Infuse 5 mg intravenously once Annually      SPIRIVA HANDIHALER 18 MCG inhalation capsule Inhale 18 mcg into the lungs daily       albuterol sulfate  (90 BASE) MCG/ACT inhaler Inhale 2 puffs into the lungs every 6 hours as needed for Wheezing      dicyclomine (BENTYL) 10 MG capsule Take 10 mg by mouth 4 times daily (before meals and nightly)      atorvastatin (LIPITOR) 10 MG tablet Take 10 mg by mouth daily      pantoprazole (PROTONIX) 40 MG tablet Take 40 mg by mouth daily.  vitamin D (CHOLECALCIFEROL) 1000 UNIT TABS tablet Take 1,000 Units by mouth daily.  Pyridoxine HCl (VITAMIN B-6) 50 MG tablet Take 25 mg by mouth daily.  Multiple Vitamins-Minerals (THERAPEUTIC MULTIVITAMIN-MINERALS) tablet Take 1 tablet by mouth daily.  calcium citrate (CALCITRATE) 950 MG tablet Take 1 tablet by mouth daily.  propranolol (INDERAL) 40 MG tablet Take 40 mg by mouth 2 times daily. No current facility-administered medications for this visit. [facility]                     ______________________________________________________________________      [address] [place]      1. Admit to preop. 2. Start IV 1000 ml LR at [facility] or _____ml/hr for planned conscious sedation     3. May inject 1 % Lidocaine 0.1 ml Intradermal to numb IV site     4. Protime/INR if patient is on Coumadin     5. Urine Pregnancy Test (females only) - 12 -50 years     6. Accu Check Glucose if diabetic. Notify physician if <80 or >250.      7. Sedate all neurotomies          ______________________________________________________________________    POST-OPERATIVE ORDERS - DR. RAMIREZ      1. Admit to Post Op Phase 2     2. Implement Standards of Care for Phase 2 Post Op     3. Check Site - May discharge when site is free of bleeding     4. Discharge to home after meets Phase 2 criteria     5. Discharge cervical patients after 30 minutes and when meets Phase 2 criteria. 6. Give discharge instruction sheet     7. For Diabetic patient, if blood sugar less than 80 in preop,          Recheck blood sugar in Post Op. 8. Discontinue IV     9.  For Nausea may give Zofran 4 MG IV/IM/ODT           ______________________________________________________________________    Melvenia Harbour     1945        1/15/21 9:54 AM

## 2021-01-18 ENCOUNTER — TELEPHONE (OUTPATIENT)
Dept: ORTHOPEDIC SURGERY | Age: 76
End: 2021-01-18

## 2021-01-20 ENCOUNTER — TELEPHONE (OUTPATIENT)
Dept: ORTHOPEDIC SURGERY | Age: 76
End: 2021-01-20

## 2021-01-22 ENCOUNTER — TELEPHONE (OUTPATIENT)
Dept: ORTHOPEDIC SURGERY | Age: 76
End: 2021-01-22

## 2021-01-22 NOTE — TELEPHONE ENCOUNTER
SPOKE [address] [facility] AND LET HER KNOW THAT THE LUIS AUTH FOR Monday IS STILL PENDING SO WILL NEED TO CX FOR NOW. GAVE THEM THE OPTION OF BEING REFERRED TO ANOTHER PHYSICAN TO DO THE INJECTION AND F/U WITH DR. Marquise RAMIREZ TO DO TE INJECTION,    THEY WILL CALL BACK WITH THEIR DECISION

## 2021-01-26 NOTE — TELEPHONE ENCOUNTER
DENIED  Date: 01/25/2021  Type of SX: OP  Location: [facility]  CPT: 69406  SX area: 6.1  REASON: Unable to approve because the provocative tests for reproduction of pain are positive on physical examination: compression test, posterior pelvic pain provocation test, thigh thrust and patricks test etc.   Peer to peer: [phone]  Option #4  Reference # [id number]

## 2021-03-19 DIAGNOSIS — M51.36 DDD (DEGENERATIVE DISC DISEASE), LUMBAR: Primary | ICD-10-CM

## 2021-03-19 DIAGNOSIS — M47.816 LUMBAR SPONDYLOSIS: ICD-10-CM

## 2021-03-19 RX ORDER — OXYCODONE HYDROCHLORIDE AND ACETAMINOPHEN 5; 325 MG/1; MG/1
1 TABLET ORAL 2 TIMES DAILY
Qty: 28 TABLET | Refills: 0 | Status: SHIPPED | OUTPATIENT
Start: 2021-03-19 | End: 2021-04-02

## 2025-05-08 ENCOUNTER — HOSPITAL ENCOUNTER (EMERGENCY)
Age: 80
Discharge: HOME | End: 2025-05-08
Attending: EMERGENCY MEDICINE
Payer: MEDICARE

## 2025-05-08 ENCOUNTER — APPOINTMENT (OUTPATIENT)
Dept: GENERAL RADIOLOGY | Age: 80
End: 2025-05-08
Payer: MEDICARE

## 2025-05-08 VITALS
OXYGEN SATURATION: 100 % | HEIGHT: 60 IN | HEART RATE: 77 BPM | WEIGHT: 132 LBS | BODY MASS INDEX: 25.91 KG/M2 | SYSTOLIC BLOOD PRESSURE: 120 MMHG | DIASTOLIC BLOOD PRESSURE: 65 MMHG | RESPIRATION RATE: 15 BRPM | TEMPERATURE: 99.1 F

## 2025-05-08 DIAGNOSIS — S20.219A CONTUSION OF CHEST WALL, UNSPECIFIED LATERALITY, INITIAL ENCOUNTER: Primary | ICD-10-CM

## 2025-05-08 PROCEDURE — 71046 X-RAY EXAM CHEST 2 VIEWS: CPT

## 2025-05-08 PROCEDURE — 99283 EMERGENCY DEPT VISIT LOW MDM: CPT

## 2025-05-08 RX ORDER — PAROXETINE 40 MG/1
40 TABLET, FILM COATED ORAL EVERY MORNING
COMMUNITY

## 2025-05-08 RX ORDER — ANTIOX #8/OM3/DHA/EPA/LUT/ZEAX 250-2.5 MG
CAPSULE ORAL
COMMUNITY
Start: 2025-04-30

## 2025-05-08 RX ORDER — OMEGA-3 FATTY ACIDS/FISH OIL 300-1000MG
CAPSULE ORAL
COMMUNITY

## 2025-05-08 RX ORDER — ATORVASTATIN CALCIUM 20 MG/1
TABLET, FILM COATED ORAL
COMMUNITY
Start: 2025-04-30

## 2025-05-08 RX ORDER — METHOCARBAMOL 750 MG/1
TABLET, FILM COATED ORAL
COMMUNITY
Start: 2025-05-05

## 2025-05-08 RX ORDER — HYDROCODONE BITARTRATE AND ACETAMINOPHEN 5; 325 MG/1; MG/1
TABLET ORAL
COMMUNITY
Start: 2025-05-05

## 2025-05-08 RX ORDER — BUDESONIDE, GLYCOPYRROLATE, AND FORMOTEROL FUMARATE 160; 9; 4.8 UG/1; UG/1; UG/1
AEROSOL, METERED RESPIRATORY (INHALATION)
COMMUNITY
Start: 2025-03-07

## 2025-05-08 ASSESSMENT — PAIN DESCRIPTION - ORIENTATION: ORIENTATION: MID

## 2025-05-08 ASSESSMENT — PAIN - FUNCTIONAL ASSESSMENT
PAIN_FUNCTIONAL_ASSESSMENT: 0-10
PAIN_FUNCTIONAL_ASSESSMENT: ACTIVITIES ARE NOT PREVENTED

## 2025-05-08 ASSESSMENT — PAIN DESCRIPTION - DESCRIPTORS: DESCRIPTORS: SHARP

## 2025-05-08 ASSESSMENT — PAIN SCALES - GENERAL: PAINLEVEL_OUTOF10: 7

## 2025-05-08 ASSESSMENT — LIFESTYLE VARIABLES
HOW MANY STANDARD DRINKS CONTAINING ALCOHOL DO YOU HAVE ON A TYPICAL DAY: 1 OR 2
HOW OFTEN DO YOU HAVE A DRINK CONTAINING ALCOHOL: 2-3 TIMES A WEEK

## 2025-05-08 ASSESSMENT — PAIN DESCRIPTION - LOCATION: LOCATION: CHEST

## 2025-05-08 ASSESSMENT — PAIN DESCRIPTION - PAIN TYPE: TYPE: ACUTE PAIN

## 2025-05-08 NOTE — ED PROVIDER NOTES
[facility] EMERGENCY DEPARTMENT  EMERGENCY DEPARTMENT ENCOUNTER      Pt Name: Mary Mcdowell  MRN: [id number]  Birthdate 1945  Date of evaluation: 5/8/2025  Provider: Shantel Gibson MD    CHIEF COMPLAINT       Chief Complaint   Patient presents with    Fall         HISTORY OF PRESENT ILLNESS   (Location/Symptom, Timing/Onset, Context/Setting, Quality, Duration, Modifying Factors, Severity)  Note limiting factors.   Mary Mcdowell is a 79 y.o. female who presents to the emergency department with chest wall pain after she tripped and fell.  Patient was going out to feed the cats and tripped and fell into the barrier and \"hung there\" by her right arm.  She did hit her face but denies any pain.  No loss of consciousness.  She is not on blood thinners.  No difficulty breathing.  Pain in her anterior and left side of her chest worse with deep breaths or leaning over.  No shortness of breath.  No abdominal pain.  No nausea or vomiting.  No headache.  No vision changes.  No weakness, numbness, tingling.      This patient is at risk for a communicable infection. Therefore, personal protection equipment consisting of a mask and gloves worn for the exam.     Nursing Notes were reviewed.    REVIEW OF SYSTEMS    (2-9 systems for level 4, 10 or more for level 5)     As per HPI    Except as noted above the remainder of the review of systems was reviewed and negative.       PAST MEDICAL HISTORY     Past Medical History:   Diagnosis Date    Arthritis     Bone marrow disorder     \"sluggish bone marrow\"    Cancer (HCC) breast 1990    COPD (chronic obstructive pulmonary disease) (HCC)     Degeneration of cervical intervertebral disc 6/4/2012    Diabetes mellitus (HCC)     Greater trochanteric bursitis of right hip 7/6/2018    Hyperlipidemia     Kyphoscoliosis and scoliosis 10/12/2009    Macular degeneration (senile) of retina          SURGICAL HISTORY       Past Surgical History:   Procedure Laterality Date    BREAST

## 2025-05-08 NOTE — ED TRIAGE NOTES
Pt arrived via EMS from [facility], was outside feeding cats, tripped over a rug and hit chest on metal hand rail. Tender to touch, no c/o chest pain or SOB.